# Patient Record
Sex: FEMALE | Race: WHITE
[De-identification: names, ages, dates, MRNs, and addresses within clinical notes are randomized per-mention and may not be internally consistent; named-entity substitution may affect disease eponyms.]

---

## 2023-06-07 ENCOUNTER — APPOINTMENT (OUTPATIENT)
Dept: GASTROENTEROLOGY | Facility: CLINIC | Age: 73
End: 2023-06-07
Payer: MEDICARE

## 2023-06-07 DIAGNOSIS — R12 HEARTBURN: ICD-10-CM

## 2023-06-07 DIAGNOSIS — K31.89 OTHER DISEASES OF STOMACH AND DUODENUM: ICD-10-CM

## 2023-06-07 DIAGNOSIS — B96.81 GASTRITIS, UNSPECIFIED, W/OUT BLEEDING: ICD-10-CM

## 2023-06-07 DIAGNOSIS — K29.70 GASTRITIS, UNSPECIFIED, W/OUT BLEEDING: ICD-10-CM

## 2023-06-07 DIAGNOSIS — Z78.9 OTHER SPECIFIED HEALTH STATUS: ICD-10-CM

## 2023-06-07 PROBLEM — Z00.00 ENCOUNTER FOR PREVENTIVE HEALTH EXAMINATION: Status: ACTIVE | Noted: 2023-06-07

## 2023-06-07 PROCEDURE — 99442: CPT

## 2023-06-07 RX ORDER — OMEPRAZOLE 20 MG/1
20 CAPSULE, DELAYED RELEASE ORAL
Refills: 0 | Status: ACTIVE | COMMUNITY
Start: 2023-06-07

## 2023-06-12 ENCOUNTER — RESULT REVIEW (OUTPATIENT)
Age: 73
End: 2023-06-12

## 2023-06-12 ENCOUNTER — OUTPATIENT (OUTPATIENT)
Dept: INPATIENT UNIT | Facility: HOSPITAL | Age: 73
LOS: 1 days | Discharge: HOME | End: 2023-06-12
Payer: MEDICARE

## 2023-06-12 ENCOUNTER — TRANSCRIPTION ENCOUNTER (OUTPATIENT)
Age: 73
End: 2023-06-12

## 2023-06-12 VITALS
TEMPERATURE: 97 F | RESPIRATION RATE: 18 BRPM | SYSTOLIC BLOOD PRESSURE: 157 MMHG | HEART RATE: 71 BPM | DIASTOLIC BLOOD PRESSURE: 78 MMHG | WEIGHT: 177.91 LBS | HEIGHT: 63 IN

## 2023-06-12 VITALS
HEART RATE: 63 BPM | DIASTOLIC BLOOD PRESSURE: 74 MMHG | SYSTOLIC BLOOD PRESSURE: 146 MMHG | OXYGEN SATURATION: 96 % | RESPIRATION RATE: 18 BRPM

## 2023-06-12 DIAGNOSIS — K31.89 OTHER DISEASES OF STOMACH AND DUODENUM: ICD-10-CM

## 2023-06-12 DIAGNOSIS — K29.70 GASTRITIS, UNSPECIFIED, WITHOUT BLEEDING: ICD-10-CM

## 2023-06-12 DIAGNOSIS — Z90.49 ACQUIRED ABSENCE OF OTHER SPECIFIED PARTS OF DIGESTIVE TRACT: Chronic | ICD-10-CM

## 2023-06-12 PROCEDURE — 88341 IMHCHEM/IMCYTCHM EA ADD ANTB: CPT | Mod: 26

## 2023-06-12 PROCEDURE — 88312 SPECIAL STAINS GROUP 1: CPT | Mod: 26

## 2023-06-12 PROCEDURE — 88305 TISSUE EXAM BY PATHOLOGIST: CPT

## 2023-06-12 PROCEDURE — 88313 SPECIAL STAINS GROUP 2: CPT

## 2023-06-12 PROCEDURE — 88305 TISSUE EXAM BY PATHOLOGIST: CPT | Mod: 26

## 2023-06-12 PROCEDURE — 88342 IMHCHEM/IMCYTCHM 1ST ANTB: CPT

## 2023-06-12 PROCEDURE — 88307 TISSUE EXAM BY PATHOLOGIST: CPT | Mod: 26

## 2023-06-12 PROCEDURE — 88312 SPECIAL STAINS GROUP 1: CPT

## 2023-06-12 PROCEDURE — 88342 IMHCHEM/IMCYTCHM 1ST ANTB: CPT | Mod: 26

## 2023-06-12 PROCEDURE — 43239 EGD BIOPSY SINGLE/MULTIPLE: CPT | Mod: XU

## 2023-06-12 PROCEDURE — 88341 IMHCHEM/IMCYTCHM EA ADD ANTB: CPT

## 2023-06-12 PROCEDURE — 88307 TISSUE EXAM BY PATHOLOGIST: CPT

## 2023-06-12 PROCEDURE — 88313 SPECIAL STAINS GROUP 2: CPT | Mod: 26

## 2023-06-12 PROCEDURE — 43242 EGD US FINE NEEDLE BX/ASPIR: CPT

## 2023-06-12 NOTE — ASU PATIENT PROFILE, ADULT - LANGUAGE ASSISTANCE NEEDED
thru  pt requests daughter to interpret/No-Patient/Caregiver offered and refused free interpretation services.

## 2023-06-12 NOTE — ASU PATIENT PROFILE, ADULT - FALL HARM RISK - UNIVERSAL INTERVENTIONS
Bed in lowest position, wheels locked, appropriate side rails in place/Call bell, personal items and telephone in reach/Instruct patient to call for assistance before getting out of bed or chair/Non-slip footwear when patient is out of bed/[place] to call system/Physically safe environment - no spills, clutter or unnecessary equipment/Purposeful Proactive Rounding/Room/bathroom lighting operational, light cord in reach

## 2023-06-12 NOTE — ASU DISCHARGE PLAN (ADULT/PEDIATRIC) - CALL YOUR DOCTOR IF YOU HAVE ANY OF THE FOLLOWING:
Bleeding that does not stop/Fever greater than (need to indicate Fahrenheit or Celsius)/Wound/Surgical Site with redness, or foul smelling discharge or pus/Numbness, tingling, color or temperature change to extremity/Excessive diarrhea/Inability to tolerate liquids or foods

## 2023-06-12 NOTE — CHART NOTE - NSCHARTNOTEFT_GEN_A_CORE
PACU ANESTHESIA ADMISSION NOTE      Procedure:   Post op diagnosis:      ____  Intubated  TV:______       Rate: ______      FiO2: ______    _x___  Patent Airway    _x___  Full return of protective reflexes    _x___  Full recovery from anesthesia / back to baseline status    Vitals  SPO2:-100  HR:-74  RR:-11  B.P:-99/52  TEMP:-98    Mental Status:  _x___ Awake   ___x_ Alert   _____ Drowsy   _____ Sedated    Nausea/Vomiting:  _x___  NO       ______Yes,   See Post - Op Orders         Pain Scale (0-10):  __0___    Treatment: _x___ None    __x__ See Post - Op/PCA Orders    Post - Operative Fluids:   ___ Oral   ____x See Post - Op Orders    Plan: Discharge:   __x__Home       ____Floor     _____Critical Care    _____  Other:_________________    Comments:  Report endorsed to RN in pacu  Vitals stable  No anesthesia issues or complications noted.  Discharge to patient to / home when criteria met.

## 2023-06-12 NOTE — ASU DISCHARGE PLAN (ADULT/PEDIATRIC) - CARE PROVIDER_API CALL
Jayme Mojica  Gastroenterology  [address] [place] [address]  Phone: [phone]  Fax: [phone]  Follow Up Time: Routine

## 2023-06-14 LAB — SURGICAL PATHOLOGY STUDY: SIGNIFICANT CHANGE UP

## 2023-06-16 DIAGNOSIS — K29.50 UNSPECIFIED CHRONIC GASTRITIS WITHOUT BLEEDING: ICD-10-CM

## 2023-06-16 DIAGNOSIS — K31.9 DISEASE OF STOMACH AND DUODENUM, UNSPECIFIED: ICD-10-CM

## 2023-06-16 DIAGNOSIS — K20.0 EOSINOPHILIC ESOPHAGITIS: ICD-10-CM

## 2023-06-16 DIAGNOSIS — K76.9 LIVER DISEASE, UNSPECIFIED: ICD-10-CM

## 2023-06-16 DIAGNOSIS — Z90.49 ACQUIRED ABSENCE OF OTHER SPECIFIED PARTS OF DIGESTIVE TRACT: ICD-10-CM

## 2023-06-16 LAB — SURGICAL PATHOLOGY STUDY: SIGNIFICANT CHANGE UP

## 2023-06-28 NOTE — ASSESSMENT
[FreeTextEntry1] : 73 yr old female with H/O H Pylori, heartburn was referred to us by Dr. Newman for further evaluation of a gastric mass noted on the lesser curvature of the gastric body seen on her last EGD done 4/26/23. Biopsies were positive for H Pylori; according to her son, she was treated for H Pylori in the past but not since her last EGD. She reported occasional heartburn that usually resolves with Omeprazole 20 mg a day. She denied dysphagia, vomiting, weight loss, melena, abdominal pain, constipation, diarrhea, or blood in the stool. \par \par \par Gastric mass, H Pylori gastritis\par \par - EGD results reviewed from 4/26/23; she will be scheduled for an EGD/EUS sometime this month; all risks and ;benefits of procedure were explained to patient's son; he requested that his sister, Irma be called regarding scheduling the procedure at [phone] \par - Will probably need retreatment for the H Pylori\par - F/U after the procedure is done

## 2023-06-28 NOTE — REVIEW OF SYSTEMS
[Heartburn] : heartburn [Negative] : Respiratory [Abdominal Pain] : no abdominal pain [Vomiting] : no vomiting [Constipation] : no constipation [Diarrhea] : no diarrhea [Melena (black stool)] : no melena [Bleeding] : no bleeding [Fecal Incontinence (soiling)] : no fecal incontinence [Bloating (gassiness)] : no bloating

## 2023-07-05 ENCOUNTER — APPOINTMENT (OUTPATIENT)
Dept: GASTROENTEROLOGY | Facility: CLINIC | Age: 73
End: 2023-07-05
Payer: MEDICARE

## 2023-07-05 DIAGNOSIS — Z78.9 OTHER SPECIFIED HEALTH STATUS: ICD-10-CM

## 2023-07-05 DIAGNOSIS — K76.9 LIVER DISEASE, UNSPECIFIED: ICD-10-CM

## 2023-07-05 DIAGNOSIS — K76.0 FATTY (CHANGE OF) LIVER, NOT ELSEWHERE CLASSIFIED: ICD-10-CM

## 2023-07-05 PROCEDURE — 99443: CPT

## 2023-07-06 NOTE — ASSESSMENT
[FreeTextEntry1] : 73 yr old female with H/O H Pylori, heartburn was referred to us by Dr. Newman for further evaluation of a gastric mass noted on the lesser curvature of the gastric body seen on her last EGD done 4/26/23. Biopsies were positive for H Pylori; according to her son, she was treated for H Pylori in the past but not since her last EGD. She reported occasional heartburn that usually resolves with Omeprazole 20 mg a day. She denied dysphagia, vomiting, weight loss, melena, abdominal pain, constipation, diarrhea, or blood in the stool. S/P EGD and EUS showed a hypoechoic lesion arising from the liver; biopsies showed scant fibrovascular tissue without neoplasia and confirmed eradication of H Pylori. Discussed patient with her daughter, Irma today. \par \par \par H/O Gastric mass, H Pylori gastritis, S/P EGD/EUS\par \par - EGD/EUS  results reviewed with Patients daughter, Irma today; benign findings noted, no evidence of H Pylori, IM, or dysplasia and there was evidence of fatty liver. \par - Need to get the actual disc from the CT Scan that was done in [facility] as well as the official report\par - I spoke to Dr. Newman as well regarding the above and the need for the disc to view the films of the CT Scan\par - Will F/U when above films are available to review\par

## 2023-07-06 NOTE — HISTORY OF PRESENT ILLNESS
[Home] : at home, [unfilled] , at the time of the visit. [Medical Office: ([facility])___] : at the medical office located in  [FreeTextEntry3] : Rodrick - Daughter  [FreeTextEntry4] : Miranda [FreeTextEntry1] : 73 yr old female with H/O H Pylori, heartburn was referred to us by Dr. Newman for further evaluation of a gastric mass noted on the lesser curvature of the gastric body seen on her last EGD done 4/26/23. Biopsies were positive for H Pylori; according to her son, she was treated for H Pylori in the past but not since her last EGD. She reported occasional heartburn that usually resolves with Omeprazole 20 mg a day. She denied dysphagia, vomiting, weight loss, melena, abdominal pain, constipation, diarrhea, or blood in the stool. S/P EGD and EUS showed a hypoechoic lesion arising from the liver; biopsies showed scant fibrovascular tissue without neoplasia and confirmed eradication of H Pylori.  [de-identified] : 6/12/23

## 2024-05-12 ENCOUNTER — INPATIENT (INPATIENT)
Facility: HOSPITAL | Age: 74
LOS: 3 days | Discharge: HOME | DRG: 71 | End: 2024-05-16
Attending: INTERNAL MEDICINE | Admitting: STUDENT IN AN ORGANIZED HEALTH CARE EDUCATION/TRAINING PROGRAM
Payer: MEDICARE

## 2024-05-12 VITALS
HEART RATE: 87 BPM | RESPIRATION RATE: 18 BRPM | TEMPERATURE: 98 F | WEIGHT: 179.9 LBS | SYSTOLIC BLOOD PRESSURE: 196 MMHG | DIASTOLIC BLOOD PRESSURE: 82 MMHG

## 2024-05-12 DIAGNOSIS — Z90.49 ACQUIRED ABSENCE OF OTHER SPECIFIED PARTS OF DIGESTIVE TRACT: Chronic | ICD-10-CM

## 2024-05-12 DIAGNOSIS — R41.82 ALTERED MENTAL STATUS, UNSPECIFIED: ICD-10-CM

## 2024-05-12 PROBLEM — Z78.9 OTHER SPECIFIED HEALTH STATUS: Chronic | Status: ACTIVE | Noted: 2023-06-12

## 2024-05-12 LAB
ALBUMIN SERPL ELPH-MCNC: 4.7 G/DL — SIGNIFICANT CHANGE UP (ref 3.5–5.2)
ALP SERPL-CCNC: 86 U/L — SIGNIFICANT CHANGE UP (ref 30–115)
ALT FLD-CCNC: 13 U/L — SIGNIFICANT CHANGE UP (ref 0–41)
ANION GAP SERPL CALC-SCNC: 12 MMOL/L — SIGNIFICANT CHANGE UP (ref 7–14)
APPEARANCE UR: ABNORMAL
APTT BLD: 27.2 SEC — SIGNIFICANT CHANGE UP (ref 27–39.2)
AST SERPL-CCNC: 21 U/L — SIGNIFICANT CHANGE UP (ref 0–41)
BACTERIA # UR AUTO: ABNORMAL /HPF
BASOPHILS # BLD AUTO: 0.05 K/UL — SIGNIFICANT CHANGE UP (ref 0–0.2)
BASOPHILS NFR BLD AUTO: 0.6 % — SIGNIFICANT CHANGE UP (ref 0–1)
BILIRUB SERPL-MCNC: 0.3 MG/DL — SIGNIFICANT CHANGE UP (ref 0.2–1.2)
BILIRUB UR-MCNC: NEGATIVE — SIGNIFICANT CHANGE UP
BUN SERPL-MCNC: 14 MG/DL — SIGNIFICANT CHANGE UP (ref 10–20)
CALCIUM SERPL-MCNC: 10.3 MG/DL — SIGNIFICANT CHANGE UP (ref 8.4–10.5)
CAST: 0 /LPF — SIGNIFICANT CHANGE UP (ref 0–4)
CHLORIDE SERPL-SCNC: 101 MMOL/L — SIGNIFICANT CHANGE UP (ref 98–110)
CO2 SERPL-SCNC: 25 MMOL/L — SIGNIFICANT CHANGE UP (ref 17–32)
COLOR SPEC: YELLOW — SIGNIFICANT CHANGE UP
CREAT SERPL-MCNC: 0.8 MG/DL — SIGNIFICANT CHANGE UP (ref 0.7–1.5)
DIFF PNL FLD: ABNORMAL
EGFR: 78 ML/MIN/1.73M2 — SIGNIFICANT CHANGE UP
EOSINOPHIL # BLD AUTO: 0.07 K/UL — SIGNIFICANT CHANGE UP (ref 0–0.7)
EOSINOPHIL NFR BLD AUTO: 0.8 % — SIGNIFICANT CHANGE UP (ref 0–8)
GLUCOSE SERPL-MCNC: 114 MG/DL — HIGH (ref 70–99)
GLUCOSE UR QL: NEGATIVE MG/DL — SIGNIFICANT CHANGE UP
HCT VFR BLD CALC: 44.7 % — SIGNIFICANT CHANGE UP (ref 37–47)
HGB BLD-MCNC: 15.3 G/DL — SIGNIFICANT CHANGE UP (ref 12–16)
IMM GRANULOCYTES NFR BLD AUTO: 0.2 % — SIGNIFICANT CHANGE UP (ref 0.1–0.3)
INR BLD: 1.02 RATIO — SIGNIFICANT CHANGE UP (ref 0.65–1.3)
KETONES UR-MCNC: NEGATIVE MG/DL — SIGNIFICANT CHANGE UP
LEUKOCYTE ESTERASE UR-ACNC: ABNORMAL
LYMPHOCYTES # BLD AUTO: 2.61 K/UL — SIGNIFICANT CHANGE UP (ref 1.2–3.4)
LYMPHOCYTES # BLD AUTO: 29.7 % — SIGNIFICANT CHANGE UP (ref 20.5–51.1)
MCHC RBC-ENTMCNC: 29.2 PG — SIGNIFICANT CHANGE UP (ref 27–31)
MCHC RBC-ENTMCNC: 34.2 G/DL — SIGNIFICANT CHANGE UP (ref 32–37)
MCV RBC AUTO: 85.3 FL — SIGNIFICANT CHANGE UP (ref 81–99)
MONOCYTES # BLD AUTO: 0.43 K/UL — SIGNIFICANT CHANGE UP (ref 0.1–0.6)
MONOCYTES NFR BLD AUTO: 4.9 % — SIGNIFICANT CHANGE UP (ref 1.7–9.3)
NEUTROPHILS # BLD AUTO: 5.62 K/UL — SIGNIFICANT CHANGE UP (ref 1.4–6.5)
NEUTROPHILS NFR BLD AUTO: 63.8 % — SIGNIFICANT CHANGE UP (ref 42.2–75.2)
NITRITE UR-MCNC: NEGATIVE — SIGNIFICANT CHANGE UP
NRBC # BLD: 0 /100 WBCS — SIGNIFICANT CHANGE UP (ref 0–0)
PH UR: 7.5 — SIGNIFICANT CHANGE UP (ref 5–8)
PLATELET # BLD AUTO: 236 K/UL — SIGNIFICANT CHANGE UP (ref 130–400)
PMV BLD: 10.4 FL — SIGNIFICANT CHANGE UP (ref 7.4–10.4)
POTASSIUM SERPL-MCNC: 4.4 MMOL/L — SIGNIFICANT CHANGE UP (ref 3.5–5)
POTASSIUM SERPL-SCNC: 4.4 MMOL/L — SIGNIFICANT CHANGE UP (ref 3.5–5)
PROT SERPL-MCNC: 7.3 G/DL — SIGNIFICANT CHANGE UP (ref 6–8)
PROT UR-MCNC: NEGATIVE MG/DL — SIGNIFICANT CHANGE UP
PROTHROM AB SERPL-ACNC: 11.6 SEC — SIGNIFICANT CHANGE UP (ref 9.95–12.87)
RBC # BLD: 5.24 M/UL — SIGNIFICANT CHANGE UP (ref 4.2–5.4)
RBC # FLD: 12.5 % — SIGNIFICANT CHANGE UP (ref 11.5–14.5)
RBC CASTS # UR COMP ASSIST: 3 /HPF — SIGNIFICANT CHANGE UP (ref 0–4)
SODIUM SERPL-SCNC: 138 MMOL/L — SIGNIFICANT CHANGE UP (ref 135–146)
SP GR SPEC: 1.02 — SIGNIFICANT CHANGE UP (ref 1–1.03)
SQUAMOUS # UR AUTO: >36 /HPF — HIGH (ref 0–5)
TROPONIN T, HIGH SENSITIVITY RESULT: 7 NG/L — SIGNIFICANT CHANGE UP (ref 6–13)
UROBILINOGEN FLD QL: 0.2 MG/DL — SIGNIFICANT CHANGE UP (ref 0.2–1)
WBC # BLD: 8.8 K/UL — SIGNIFICANT CHANGE UP (ref 4.8–10.8)
WBC # FLD AUTO: 8.8 K/UL — SIGNIFICANT CHANGE UP (ref 4.8–10.8)
WBC UR QL: 10 /HPF — HIGH (ref 0–5)

## 2024-05-12 PROCEDURE — 93971 EXTREMITY STUDY: CPT | Mod: RT

## 2024-05-12 PROCEDURE — 93010 ELECTROCARDIOGRAM REPORT: CPT

## 2024-05-12 PROCEDURE — 85027 COMPLETE CBC AUTOMATED: CPT

## 2024-05-12 PROCEDURE — 82746 ASSAY OF FOLIC ACID SERUM: CPT

## 2024-05-12 PROCEDURE — 82607 VITAMIN B-12: CPT

## 2024-05-12 PROCEDURE — 70450 CT HEAD/BRAIN W/O DYE: CPT | Mod: 26,MC,XU

## 2024-05-12 PROCEDURE — 70498 CT ANGIOGRAPHY NECK: CPT | Mod: 26,MC

## 2024-05-12 PROCEDURE — 86780 TREPONEMA PALLIDUM: CPT

## 2024-05-12 PROCEDURE — 84443 ASSAY THYROID STIM HORMONE: CPT

## 2024-05-12 PROCEDURE — 70551 MRI BRAIN STEM W/O DYE: CPT | Mod: MC

## 2024-05-12 PROCEDURE — 99223 1ST HOSP IP/OBS HIGH 75: CPT

## 2024-05-12 PROCEDURE — 36415 COLL VENOUS BLD VENIPUNCTURE: CPT

## 2024-05-12 PROCEDURE — 70496 CT ANGIOGRAPHY HEAD: CPT | Mod: 26,MC

## 2024-05-12 PROCEDURE — 95816 EEG AWAKE AND DROWSY: CPT

## 2024-05-12 PROCEDURE — 83735 ASSAY OF MAGNESIUM: CPT

## 2024-05-12 PROCEDURE — 80061 LIPID PANEL: CPT

## 2024-05-12 PROCEDURE — 97161 PT EVAL LOW COMPLEX 20 MIN: CPT | Mod: GP

## 2024-05-12 PROCEDURE — 0042T: CPT | Mod: MC

## 2024-05-12 PROCEDURE — 80048 BASIC METABOLIC PNL TOTAL CA: CPT

## 2024-05-12 PROCEDURE — 99285 EMERGENCY DEPT VISIT HI MDM: CPT

## 2024-05-12 RX ORDER — ENOXAPARIN SODIUM 100 MG/ML
40 INJECTION SUBCUTANEOUS EVERY 24 HOURS
Refills: 0 | Status: DISCONTINUED | OUTPATIENT
Start: 2024-05-12 | End: 2024-05-16

## 2024-05-12 RX ORDER — CEFTRIAXONE 500 MG/1
1000 INJECTION, POWDER, FOR SOLUTION INTRAMUSCULAR; INTRAVENOUS ONCE
Refills: 0 | Status: COMPLETED | OUTPATIENT
Start: 2024-05-12 | End: 2024-05-12

## 2024-05-12 RX ORDER — OMEPRAZOLE 10 MG/1
1 CAPSULE, DELAYED RELEASE ORAL
Refills: 0 | DISCHARGE

## 2024-05-12 RX ORDER — CEFTRIAXONE 500 MG/1
1000 INJECTION, POWDER, FOR SOLUTION INTRAMUSCULAR; INTRAVENOUS EVERY 24 HOURS
Refills: 0 | Status: DISCONTINUED | OUTPATIENT
Start: 2024-05-12 | End: 2024-05-13

## 2024-05-12 RX ADMIN — CEFTRIAXONE 100 MILLIGRAM(S): 500 INJECTION, POWDER, FOR SOLUTION INTRAMUSCULAR; INTRAVENOUS at 16:47

## 2024-05-12 RX ADMIN — CEFTRIAXONE 100 MILLIGRAM(S): 500 INJECTION, POWDER, FOR SOLUTION INTRAMUSCULAR; INTRAVENOUS at 21:43

## 2024-05-12 RX ADMIN — ENOXAPARIN SODIUM 40 MILLIGRAM(S): 100 INJECTION SUBCUTANEOUS at 21:45

## 2024-05-12 NOTE — ED PROVIDER NOTE - PHYSICAL EXAMINATION
VITAL SIGNS: I have reviewed nursing notes and confirm.  CONSTITUTIONAL: Well-appearing, non-toxic, in NAD  SKIN: Warm dry, normal skin turgor  HEAD: NCAT  EYES: No conjunctival injection, scleral anicteric  ENT: MMM  NECK: Supple; FROM.   CARD: RRR  RESP: CTAB  ABD: Soft, NDNT  EXT: No pedal edema, no calf tenderness  NEURO: A&Ox2, CN2-12 intact and equal bilateral. PERRLA, EOMI. Clear and fluent speech. No pronator drift, no ataxia/normal FNF. Strength 5/5 throughout, normal sensation.   PSYCH: Cooperative, appropriate

## 2024-05-12 NOTE — CONSULT NOTE ADULT - SUBJECTIVE AND OBJECTIVE BOX
**STROKE CODE CONSULT NOTE**    Last known well time/Time of onset of symptoms: 24 190    HPI: 74y Female with PMHx of     T(C): 36.6 (24 @ 13:53), Max: 36.6 (24 @ 13:53)  HR: 87 (24 @ 13:53) (87 - 87)  BP: 196/ (24 @ 13:53) ( - )  RR: 18 (24 @ 13:53) (18 - 18)  SpO2: --    PAST MEDICAL & SURGICAL HISTORY:  No pertinent past medical history      History of appendectomy          FAMILY HISTORY:  No pertinent family history in first degree relatives        SOCIAL HISTORY:  Denies smoking, drinking, or drug use    ROS: ***  Constitutional: No fever, weight loss or fatigue  Eyes: No eye pain, visual disturbances, or discharge  ENMT:  No difficulty hearing, tinnitus, vertigo; No sinus or throat pain  Neck: No pain or stiffness  Respiratory: No cough, wheezing, chills or hemoptysis  Cardiovascular: No chest pain, palpitations, shortness of breath, dizziness or leg swelling  Gastrointestinal: No abdominal pain. No nausea, vomiting or hematemesis; No diarrhea or constipation. Nohematochezia.  Genitourinary: No dysuria, frequency, hematuria or incontinence  Neurological: As per HPI  Skin: No itching, burning, rashes or lesions   Endocrine: No heat or cold intolerance; No hair loss  Musculoskeletal: No joint pain or swelling; No muscle, back or extremity pain  Psychiatric: No depression, anxiety, mood swings or difficulty sleeping  Heme/Lymph: No easy bruising or bleeding gums    MEDICATIONS  (STANDING):    MEDICATIONS  (PRN):    Home Medications:  omeprazole 20 mg oral delayed release tablet: 1 orally (2023 14:19)      Allergies    No Known Allergies    Intolerances      Vital Signs Last 24 Hrs  T(C): 36.6 (12 May 2024 13:53), Max: 36.6 (12 May 2024 13:53)  T(F): 97.8 (12 May 2024 13:53), Max: 97.8 (12 May 2024 13:53)  HR: 87 (12 May 2024 13:53) (87 - 87)  BP: 196/82 (12 May 2024 13:53) (196/82 - 196/82)  BP(mean): --  RR: 18 (12 May 2024 13:53) (18 - 18)  SpO2: --    Parameters below as of 12 May 2024 13:53  Patient On (Oxygen Delivery Method): room air        Physical exam:  General: No acute distress, awake and alert  Cardiovascular: Regular rate and rhythm, no murmurs, rubs, or gallops. No bruits  Pulmonary: Anterior breath sounds clear bilaterally, no crackles or wheezing. No use of accessory muscles  Extremities: Radial and DP pulses +2, no edema    Neurologic:  -Mental status: Awake, alert, oriented to person, place, and time. Speech is fluent with intact naming, repetition, and comprehension, no dysarthria. Recent and remote memory intact. Follows commands. Attention/concentration intact. Fund of knowledge appropriate.  -Cranial nerves:   II: Visual fields are full to confrontation.  III, IV, VI: Extraocular movements are intact without nystagmus. Pupils equally round and reactive to light  V:  Facial sensation V1-V3 equal and intact   VII: Face is symmetric with normal eye closure and smile  VIII: Hearing is bilaterally intact to finger rub  IX, X: Uvula is midline and soft palate rises symmetrically  XI: Head turning and shoulder shrug are intact.  XII: Tongue protrudes midline  Motor: Normal bulk and tone. No pronator drift. Strength bilateral upper extremity 5/5, bilateral lower extremities 5/5.  Rapid alternating movements intact and symmetric  Sensation: Intact to light touch bilaterally. No neglect or extinction on double simultaneous testing.  Coordination: No dysmetria on finger-to-nose and heel-to-shin bilaterally  Reflexes: Downgoing toes bilaterally   Gait: Narrow gait and steady    NIHSS: ****  ICH: *****  GCS: *****  ASPECTS Score: ****    Fingerstick Blood Glucose: CAPILLARY BLOOD GLUCOSE  106 (12 May 2024 16:14)      POCT Blood Glucose.: 106 mg/dL (12 May 2024 13:45)    LABS:                        15.3   8.80  )-----------( 236      ( 12 May 2024 14:15 )             44.7     05-12    138  |  101  |  14  ----------------------------<  114<H>  4.4   |  25  |  0.8    Ca    10.3      12 May 2024 14:15    TPro  7.3  /  Alb  4.7  /  TBili  0.3  /  DBili  x   /  AST  21  /  ALT  13  /  AlkPhos  86  05-12    PT/INR - ( 12 May 2024 14:15 )   PT: 11.60 sec;   INR: 1.02 ratio         PTT - ( 12 May 2024 14:15 )  PTT:27.2 sec      Urinalysis Basic - ( 12 May 2024 15:13 )    Color: Yellow / Appearance: Turbid / S.020 / pH: x  Gluc: x / Ketone: Negative mg/dL  / Bili: Negative / Urobili: 0.2 mg/dL   Blood: x / Protein: Negative mg/dL / Nitrite: Negative   Leuk Esterase: Moderate / RBC: 3 /HPF / WBC 10 /HPF   Sq Epi: x / Non Sq Epi: >36 /HPF / Bacteria: Moderate /HPF        RADIOLOGY & ADDITIONAL STUDIES:    HCT:    CTA:    CTP:      -----------------------------------------------------------------------------------------------------------------  IV-tenecetplase(Y/N):    ***                              Bolus time:  Reason IV-tenecetplase not given: ***  Alteplase dosing verified with RN _______      ASSESSMENT/PLAN:    74y Female w/ PMH ***. HCT showed ***. CTA showed ***. Given *** tenecetplase was ***.         **STROKE CODE CONSULT NOTE**    Last known well time/Time of onset of symptoms: 24 190    HPI: 74y Female with PMHx of anxiety presents for AMS and numbness on left side of face and left arm. As per family, pt was in her usual state of health until 7pm yesturday when she became acutely altered. Per daughter, pt was hallucinating asking about dead family members. Pt went to sleep and woke up endorsing left sided facial and arm numbness described as pins and needles sensation. Pt also was endorsing chest tightness. Family endorses pt continues to be confused asking what happened yesterday. Of note, as per daughter, pts anxiety mainifests as numbness on one side of the body, and pt had family friend pass away 3d ago. Pt denies vision change, seizures, facial asymmetry, HA, falls, leaning/favoring one side of body.     T(C): 36.6 (24 @ 13:53), Max: 36.6 (24 @ 13:53)  HR: 87 (24 @ 13:53) (87 - 87)  BP: 196/82 (24 @ 13:53) (19682 - )  RR: 18 (24 @ 13:53) (18 - 18)  SpO2: --    PAST MEDICAL & SURGICAL HISTORY:  No pertinent past medical history      History of appendectomy          FAMILY HISTORY:  No pertinent family history in first degree relatives        SOCIAL HISTORY:  Denies smoking, drinking, or drug use    ROS: see hpi     MEDICATIONS  (STANDING):    MEDICATIONS  (PRN):    Home Medications:  omeprazole 20 mg oral delayed release tablet: 1 orally (2023 14:19)      Allergies    No Known Allergies    Intolerances      Vital Signs Last 24 Hrs  T(C): 36.6 (12 May 2024 13:53), Max: 36.6 (12 May 2024 13:53)  T(F): 97.8 (12 May 2024 13:53), Max: 97.8 (12 May 2024 13:53)  HR: 87 (12 May 2024 13:53) (87 - 87)  BP: 196/82 (12 May 2024 13:53) (196/82 - 196/)  BP(mean): --  RR: 18 (12 May 2024 13:53) (18 - 18)  SpO2: --    Parameters below as of 12 May 2024 13:53  Patient On (Oxygen Delivery Method): room air        Physical exam:  General: No acute distress, awake and alert    Neurologic:  -Mental status: Awake, alert, A&O x2 (not oriented to time). Speech is fluent with intact naming, repetition, and comprehension, no dysarthria. Recent and remote memory intact. Follows commands. Attention/concentration intact. Fund of knowledge appropriate.  -Cranial nerves:   II: Visual fields are full to confrontation.  III, IV, VI: Extraocular movements are intact without nystagmus. Pupils equally round and reactive to light  V:  Facial sensation V1-V3 equal and intact   VII: Face is symmetric with normal eye closure and smile  VIII: Hearing is bilaterally intact to finger rub  IX, X: Uvula is midline and soft palate rises symmetrically  XI: Head turning and shoulder shrug are intact.  XII: Tongue protrudes midline  Motor: Normal bulk and tone. No pronator drift. Strength bilateral upper extremity 5/5, bilateral lower extremities 5/5.ic  Sensation: Intact to light touch bilaterally. No neglect or extinction on double simultaneous testing.  Coordination: No dysmetria on finger-to-nose and heel-to-shin bilaterally      NIHSS:1    Fingerstick Blood Glucose: CAPILLARY BLOOD GLUCOSE  106 (12 May 2024 16:14)      POCT Blood Glucose.: 106 mg/dL (12 May 2024 13:45)    LABS:                        15.3   8.80  )-----------( 236      ( 12 May 2024 14:15 )             44.7     05-12    138  |  101  |  14  ----------------------------<  114<H>  4.4   |  25  |  0.8    Ca    10.3      12 May 2024 14:15    TPro  7.3  /  Alb  4.7  /  TBili  0.3  /  DBili  x   /  AST  21  /  ALT  13  /  AlkPhos  86  05-12    PT/INR - ( 12 May 2024 14:15 )   PT: 11.60 sec;   INR: 1.02 ratio         PTT - ( 12 May 2024 14:15 )  PTT:27.2 sec      Urinalysis Basic - ( 12 May 2024 15:13 )    Color: Yellow / Appearance: Turbid / S.020 / pH: x  Gluc: x / Ketone: Negative mg/dL  / Bili: Negative / Urobili: 0.2 mg/dL   Blood: x / Protein: Negative mg/dL / Nitrite: Negative   Leuk Esterase: Moderate / RBC: 3 /HPF / WBC 10 /HPF   Sq Epi: x / Non Sq Epi: >36 /HPF / Bacteria: Moderate /HPF        RADIOLOGY & ADDITIONAL STUDIES:    HCT:  IMPRESSION:  No CT evidence of large acute territorial infarct or acute intracranial pathology.      PERFUSION:  No evidence of perfusion abnormality.    CTA HEAD:  No evidence of intracranial vessel occlusion or aneurysm.    CTA NECK:  No evidence of greater than 50% carotid or vertebral artery stenosis.  -----------------------------------------------------------------------------------------------------------------  IV-tenecetplase(Y/N):   no  Reason IV-tenecetplase not given: out of window     =  **STROKE CODE CONSULT NOTE**    Last known well time/Time of onset of symptoms: 24 1900    HPI: 74y Female with PMHx of anxiety presents for AMS and numbness on left side of face and left arm. As per family, pt was in her usual state of health until 7pm yesterday when she became acutely altered. Per daughter, pt was hallucinating asking about dead family members. Pt went to sleep and woke up endorsing left sided facial and arm numbness described as pins and needles sensation. Pt also was endorsing chest tightness. Family endorses pt continues to be confused asking what happened yesterday. Of note, as per daughter, pts anxiety manifests as numbness on one side of the body, and pt had family friend pass away 3d ago. Pt denies vision change, seizures, facial asymmetry, HA, falls, leaning/favoring one side of body.     T(C): 36.6 (24 @ 13:53), Max: 36.6 (24 @ 13:53)  HR: 87 (24 @ 13:53) (87 - 87)  BP: 196/82 (24 @ 13:53) (196/82 - 196/82)  RR: 18 (24 @ 13:53) (18 - 18)  SpO2: --    PAST MEDICAL & SURGICAL HISTORY:  No pertinent past medical history      History of appendectomy          FAMILY HISTORY:  No pertinent family history in first degree relatives        SOCIAL HISTORY:  Denies smoking, drinking, or drug use    ROS: see hpi     MEDICATIONS  (STANDING):    MEDICATIONS  (PRN):    Home Medications:  omeprazole 20 mg oral delayed release tablet: 1 orally (2023 14:19)      Allergies    No Known Allergies    Intolerances      Vital Signs Last 24 Hrs  T(C): 36.6 (12 May 2024 13:53), Max: 36.6 (12 May 2024 13:53)  T(F): 97.8 (12 May 2024 13:53), Max: 97.8 (12 May 2024 13:53)  HR: 87 (12 May 2024 13:53) (87 - 87)  BP: 196/82 (12 May 2024 13:53) (196/82 - 196/82)  BP(mean): --  RR: 18 (12 May 2024 13:53) (18 - 18)  SpO2: --    Parameters below as of 12 May 2024 13:53  Patient On (Oxygen Delivery Method): room air        Physical exam:  General: No acute distress, awake and alert    Neurologic:  -Mental status: Awake, alert, A&O x2 (not oriented to time). Speech is fluent with intact naming, repetition, and comprehension, no dysarthria. Recent and remote memory intact. Follows commands. Attention/concentration intact. Fund of knowledge appropriate.  -Cranial nerves:   II: Visual fields are full to confrontation.  III, IV, VI: Extraocular movements are intact without nystagmus. Pupils equally round and reactive to light  V:  Facial sensation V1-V3 equal and intact   VII: Face is symmetric with normal eye closure and smile  VIII: Hearing is bilaterally intact to finger rub  IX, X: Uvula is midline and soft palate rises symmetrically  XI: Head turning and shoulder shrug are intact.  XII: Tongue protrudes midline  Motor: Normal bulk and tone. No pronator drift. Strength bilateral upper extremity 5/5, bilateral lower extremities 5/5.ic  Sensation: Intact to light touch bilaterally. No neglect or extinction on double simultaneous testing.  Coordination: No dysmetria on finger-to-nose and heel-to-shin bilaterally      NIHSS:1    Fingerstick Blood Glucose: CAPILLARY BLOOD GLUCOSE  106 (12 May 2024 16:14)      POCT Blood Glucose.: 106 mg/dL (12 May 2024 13:45)    LABS:                        15.3   8.80  )-----------( 236      ( 12 May 2024 14:15 )             44.7     05-12    138  |  101  |  14  ----------------------------<  114<H>  4.4   |  25  |  0.8    Ca    10.3      12 May 2024 14:15    TPro  7.3  /  Alb  4.7  /  TBili  0.3  /  DBili  x   /  AST  21  /  ALT  13  /  AlkPhos  86  05-12    PT/INR - ( 12 May 2024 14:15 )   PT: 11.60 sec;   INR: 1.02 ratio         PTT - ( 12 May 2024 14:15 )  PTT:27.2 sec      Urinalysis Basic - ( 12 May 2024 15:13 )    Color: Yellow / Appearance: Turbid / S.020 / pH: x  Gluc: x / Ketone: Negative mg/dL  / Bili: Negative / Urobili: 0.2 mg/dL   Blood: x / Protein: Negative mg/dL / Nitrite: Negative   Leuk Esterase: Moderate / RBC: 3 /HPF / WBC 10 /HPF   Sq Epi: x / Non Sq Epi: >36 /HPF / Bacteria: Moderate /HPF        RADIOLOGY & ADDITIONAL STUDIES:    HCT:  IMPRESSION:  No CT evidence of large acute territorial infarct or acute intracranial pathology.      PERFUSION:  No evidence of perfusion abnormality.    CTA HEAD:  No evidence of intracranial vessel occlusion or aneurysm.    CTA NECK:  No evidence of greater than 50% carotid or vertebral artery stenosis.  -----------------------------------------------------------------------------------------------------------------  IV-tenecetplase(Y/N):   no  Reason IV-tenecetplase not given: out of window     =

## 2024-05-12 NOTE — ED ADULT NURSE NOTE - OBJECTIVE STATEMENT
Patient presents to ED c/o left side numbness, noted with full ROM of extremities, no signs of drift noted. Patient also reports AMS, unsure of where she is and family reports patient Is not at her baseline mental status

## 2024-05-12 NOTE — ED PROVIDER NOTE - NSTOBACCOUNKNOWNSMK_GEN_A_CORE_RD
Progress Notes by Amrik Lundberg at 08/29/17 11:11 AM     Author:  Amrik Lundberg Service:  (none) Author Type:  Certified Medical Assistant     Filed:  08/29/17 11:11 AM Encounter Date:  8/28/2017 Status:  Signed     :  Milly Louis (Certified Medical Assistant)            Patient response noted via [address] [place] [address].   Please see results from[CY1.1T] 8/28/17[CY1.1M]      Revision History        User Key Date/Time User Provider Type Action    > CY1.1 08/29/17 11:11 AM Milly Louis Certified Medical Assistant Sign    M - Manual, T - Template Cognitively Impaired

## 2024-05-12 NOTE — H&P ADULT - NSHPLABSRESULTS_GEN_ALL_CORE
LABS:  cret                        15.3   8.80  )-----------( 236      ( 12 May 2024 14:15 )             44.7     05-12    138  |  101  |  14  ----------------------------<  114<H>  4.4   |  25  |  0.8    Ca    10.3      12 May 2024 14:15    TPro  7.3  /  Alb  4.7  /  TBili  0.3  /  DBili  x   /  AST  21  /  ALT  13  /  AlkPhos  86  05-12    PT/INR - ( 12 May 2024 14:15 )   PT: 11.60 sec;   INR: 1.02 ratio         PTT - ( 12 May 2024 14:15 )  PTT:27.2 sec

## 2024-05-12 NOTE — ED PROVIDER NOTE - DIFFERENTIAL DIAGNOSIS
AMS - r/o stroke vs intracranial pathology vs sepsis vs other underlying metabolic derangement. Differential Diagnosis

## 2024-05-12 NOTE — PATIENT PROFILE ADULT - FUNCTIONAL ASSESSMENT - DAILY ACTIVITY 5.
Dermatology Rooming Note    Jon Yun's goals for this visit include:   Chief Complaint   Patient presents with    Derm Problem     Eric is here today for a lesion of concern on his left scalp.       Alex Pearce, CMA    
Lidocaine-epinephrine 1-1:446408 % injection   0.5 mL once for one use, starting 4/23/2024 ending 4/23/2024,  2mL disp, R-0, injection  Injected by Alex Pearce CMA     
3 = A little assistance

## 2024-05-12 NOTE — ED ADULT NURSE NOTE - NSFALLUNIVINTERV_ED_ALL_ED
done
Bed/Stretcher in lowest position, wheels locked, appropriate side rails in place/Call bell, personal items and telephone in reach/Instruct patient to call for assistance before getting out of bed/chair/stretcher/Non-slip footwear applied when patient is off stretcher/[place] to call system/Physically safe environment - no spills, clutter or unnecessary equipment/Purposeful proactive rounding/Room/bathroom lighting operational, light cord in reach

## 2024-05-12 NOTE — CONSULT NOTE ADULT - NS ATTEND AMEND GEN_ALL_CORE FT
My attestation supersedes that of the above documentation.    Pt is a 75 yo F with PMHx of anxiety who presented with spell of memory loss while crying at a close family friend's . She does not recall getting to their house or how she got to the hospital and she was repeating questions during the event. Now back to baseline, NIHSS 0, 1/3 delayed recall.     Impr: Transient global amnesia  MRI brain pending  Consider rEEG as well  CT head without acute process and CTA head/neck without significant flow limiting stenosis  ASA/statin for now, however if no stroke on MRI, may d/c

## 2024-05-12 NOTE — H&P ADULT - ATTENDING COMMENTS
Patient seen and examined at bedside independently of the residents. I read the resident's note and agree with the plan with the additions and corrections as noted below. My note supersedes the resident's note.     REVIEW OF SYSTEMS:  Negative except in HPI.     PMH: Anxiety and Mild dementia     FHx: Reviewed. No fhx of asthma/copd, No fhx of liver and pulmonary disease. No fhx of hematological disorder.     Physical Exam:  GEN: No acute distress. Awake, Alert and oriented x 2.   Head: Atraumatic, Normocephalic.   Eye: PEERLA. No sclera icterus. EOMI.   ENT: Normal oropharynx, no thyromegaly, no mass, no lymphadenopathy.   LUNGS: Clear to auscultation bilaterally. No wheeze/rales/crackles.   HEART: Normal. S1/S2 present. RRR. No murmur/gallops.   ABD: Soft, non-tender, non-distended. Bowel sounds present.   EXT: No pitting edema. No erythema. No tenderness.  Integumentary: No rash, No sore, No petechia.   NEURO: CN III-XII intact. Strength: 5/5 b/l ULE. Sensory intact b/l ULE.     Vital Signs Last 24 Hrs  T(C): 36.6 (12 May 2024 18:02), Max: 36.6 (12 May 2024 13:53)  T(F): 97.9 (12 May 2024 18:02), Max: 97.9 (12 May 2024 18:02)  HR: 64 (12 May 2024 18:02) (64 - 87)  BP: 149/69 (12 May 2024 18:02) (149/69 - 196/82)  BP(mean): 96 (12 May 2024 18:02) (96 - 96)  RR: 18 (12 May 2024 18:02) (18 - 18)  SpO2: 95% (12 May 2024 18:02) (95% - 99%)    Parameters below as of 12 May 2024 18:02  Patient On (Oxygen Delivery Method): room air      Please see the above notes for Labs and radiology.     Assessment and Plan:     75 yo F with hx of Anxiety and Mild dementia presents to ED for AMS and numbness.     Transient AMS   - Ddx: UTI vs. Anxiety/Panic disorder   - s/p stroke code activated in ED. s/p eval by neuro team in ED.   - CTH: neg for acute intracranial pathology.   - CTP: No evidence of perfusion abnormality.  - CTA H & N: No evidence of intracranial vessel occlusion or aneurysm. No evidence of greater than 50% carotid or vertebral artery stenosis.  - UA positive. Will treat with ceftriaxone. F/u UCx.   - check reversible dementia panel (TSH, B12, folate)  - supportive care.   - F/u Neurology.     Elevated BP likely 2/2 anxiety  - monitor BP closely. may start on low dose med if SBP persistently > 150.    Anxiety/Mild dementia - check reversible dementia panel. F/u neuro outpatient.     DVT ppx: Lovenox SC  GI ppx: not indicated.    Diet: Regular diet  Activity: as tolerated.     Date seen by the attending:   Total time spent: 75 minutes. 05/12/2024 Patient seen and examined at bedside independently of the residents. I read the resident's note and agree with the plan with the additions and corrections as noted below. My note supersedes the resident's note.     REVIEW OF SYSTEMS:  Negative except in HPI.     PMH: Anxiety and Mild dementia     FHx: Reviewed. No fhx of asthma/copd, No fhx of liver and pulmonary disease. No fhx of hematological disorder.     Physical Exam:  GEN: No acute distress. Awake, Alert and oriented x 2-3.   Head: Atraumatic, Normocephalic.   Eye: PEERLA. No sclera icterus. EOMI.   ENT: Normal oropharynx, no thyromegaly, no mass, no lymphadenopathy.   LUNGS: Clear to auscultation bilaterally. No wheeze/rales/crackles.   HEART: Normal. S1/S2 present. RRR. No murmur/gallops.   ABD: Soft, non-tender, non-distended. Bowel sounds present.   EXT: No pitting edema. No erythema. No tenderness.  Integumentary: No rash, No sore, No petechia.   NEURO: CN III-XII intact. Strength: 5/5 b/l ULE. Sensory intact b/l ULE.     Vital Signs Last 24 Hrs  T(C): 36.6 (12 May 2024 18:02), Max: 36.6 (12 May 2024 13:53)  T(F): 97.9 (12 May 2024 18:02), Max: 97.9 (12 May 2024 18:02)  HR: 64 (12 May 2024 18:02) (64 - 87)  BP: 149/69 (12 May 2024 18:02) (149/69 - 196/82)  BP(mean): 96 (12 May 2024 18:02) (96 - 96)  RR: 18 (12 May 2024 18:02) (18 - 18)  SpO2: 95% (12 May 2024 18:02) (95% - 99%)    Parameters below as of 12 May 2024 18:02  Patient On (Oxygen Delivery Method): room air      Please see the above notes for Labs and radiology.     Assessment and Plan:     73 yo F with hx of Anxiety and Mild dementia presents to ED for AMS and numbness.     Transient AMS   - Ddx: UTI vs. Anxiety/Panic disorder   - s/p stroke code activated in ED. s/p eval by neuro team in ED.   - CTH: neg for acute intracranial pathology.   - CTP: No evidence of perfusion abnormality.  - CTA H & N: No evidence of intracranial vessel occlusion or aneurysm. No evidence of greater than 50% carotid or vertebral artery stenosis.  - UA positive. Will treat with ceftriaxone. F/u UCx.   - check reversible dementia panel (TSH, B12, folate)  - supportive care.   - F/u Neurology.     Elevated BP likely 2/2 anxiety  - monitor BP closely. may start on low dose med if SBP persistently > 150.    Anxiety/Mild dementia - check reversible dementia panel. F/u neuro outpatient.     DVT ppx: Lovenox SC  GI ppx: not indicated.    Diet: Regular diet  Activity: as tolerated.     Date seen by the attending:   Total time spent: 75 minutes. 05/12/2024 Patient seen and examined at bedside independently of the residents. I read the resident's note and agree with the plan with the additions and corrections as noted below. My note supersedes the resident's note.     REVIEW OF SYSTEMS:  Negative except in HPI.     PMH: Anxiety and Mild dementia     FHx: Reviewed. No fhx of asthma/copd, No fhx of liver and pulmonary disease. No fhx of hematological disorder.     Physical Exam:  GEN: No acute distress. Awake, Alert and oriented x 2-3.   Head: Atraumatic, Normocephalic.   Eye: PEERLA. No sclera icterus. EOMI.   ENT: Normal oropharynx, no thyromegaly, no mass, no lymphadenopathy.   LUNGS: Clear to auscultation bilaterally. No wheeze/rales/crackles.   HEART: Normal. S1/S2 present. RRR. No murmur/gallops.   ABD: Soft, non-tender, non-distended. Bowel sounds present.   EXT: No pitting edema. No erythema. No tenderness.  Integumentary: No rash, No sore, No petechia.   NEURO: CN III-XII intact. Strength: 5/5 b/l ULE. Sensory intact b/l ULE.     Vital Signs Last 24 Hrs  T(C): 36.6 (12 May 2024 18:02), Max: 36.6 (12 May 2024 13:53)  T(F): 97.9 (12 May 2024 18:02), Max: 97.9 (12 May 2024 18:02)  HR: 64 (12 May 2024 18:02) (64 - 87)  BP: 149/69 (12 May 2024 18:02) (149/69 - 196/82)  BP(mean): 96 (12 May 2024 18:02) (96 - 96)  RR: 18 (12 May 2024 18:02) (18 - 18)  SpO2: 95% (12 May 2024 18:02) (95% - 99%)    Parameters below as of 12 May 2024 18:02  Patient On (Oxygen Delivery Method): room air      Please see the above notes for Labs and radiology.     Assessment and Plan:     75 yo F with hx of Anxiety and Mild dementia presents to ED for AMS and numbness.     Transient AMS   - Ddx: UTI vs. Anxiety/Panic disorder   - s/p stroke code activated in ED. s/p eval by neuro team in ED.   - CTH: neg for acute intracranial pathology.   - CTP: No evidence of perfusion abnormality.  - CTA H & N: No evidence of intracranial vessel occlusion or aneurysm. No evidence of greater than 50% carotid or vertebral artery stenosis.  - UA positive. Will treat with ceftriaxone. F/u UCx.   - check reversible dementia panel (TSH, B12, folate)  - supportive care.   - F/u Neurology.     Elevated BP likely 2/2 anxiety  - monitor BP closely. may start on low dose med if SBP persistently > 150.    Anxiety/Mild dementia - check reversible dementia panel. F/u neuro outpatient.     DVT ppx: Lovenox SC  GI ppx: not indicated.    Diet: Regular diet  Activity: as tolerated.     Date seen by the attendin2024  Total time spent: 75 minutes.

## 2024-05-12 NOTE — ED PROVIDER NOTE - PRINCIPAL DIAGNOSIS
AMS (altered mental status) Was The Patient On Physician Recommended Anticoagulation Therapy?: Please Select the Appropriate Response

## 2024-05-12 NOTE — ED ADULT NURSE NOTE - CCCP TRG CHIEF CMPLNT
numbness Gen: Awake, NAD, speaking in complete sentences  Head: NC, AT, EOMI, normal lids/conjunctiva  ENT: normal hearing, patent oropharynx  Neck: supple, no tenderness/meningismus, FROM, Trachea midline  Pulm: deferred, COVID PUI  CV: RRR, +dist pulses  Abd: soft, ND, no guarding/rebound tenderness  Mskel: no edema/erythema/cyanosis  Skin: no rash  Neuro: AAOx3, no sensory/motor deficits

## 2024-05-12 NOTE — ED PROVIDER NOTE - OBJECTIVE STATEMENT
75yo female PMHx anxiety presenting with left sided facial numbness and AMS; family began noticing yesterday she was asking about family members no longer present and not making much sense; LKW 20:00. This morning began c/o left sided facial "numbness"adn chest "tigthness". 75yo female PMHx anxiety presenting to the ED complaining of AMS and numbness. Family at bedside. State pt started to become confused around 8pm yesterday. Pt was not making sense and asking about family members who have passed. Pt was also confused about family being in her home where they live. Pt went to bed and this morning when the patient woke up she started to complain about left sided facial numbness and chest tightness. Upon arrival to the ED, stroke code was activated.

## 2024-05-12 NOTE — H&P ADULT - ASSESSMENT
73yo female PMHx anxiety and mild dementia presenting to the ED complaining of AMS and numbness. Family at bedside. State pt started to become confused around 8pm yesterday. Pt was not making sense and asking about family members who have passed. Pt was also confused about family being in her home where they live. Pt went to bed and this morning when the patient woke up she started to complain about left sided facial numbness and chest tightness. Pt currently asking daughter what happened to her yesterday as she has no recollection. Currently patient denies all neurologic complaints. Of significance, pt has had similar episode in the past 15 yrs ago when her son got into legal trouble and she felt extreme stress. Pt sees psychiatrist on outside but does not take meds as prescribed. Upon arrival to the ED, stroke code was activated. All findings were negative. Pt denies chest pain, SOB, abd pain, urinary symptoms.     In ED T(C): 36.6 (05-12-24 @ 13:53), Max: 36.6 (05-12-24 @ 13:53)  T(F): 97.8 (05-12-24 @ 13:53), Max: 97.8 (05-12-24 @ 13:53)  HR: 84 (05-12-24 @ 16:30) (84 - 87)  BP: 164/73 (05-12-24 @ 16:30) (164/73 - 196/82)  RR: 18 (05-12-24 @ 16:30) (18 - 18)  SpO2: 99% (05-12-24 @ 16:30) (99% - 99%)  Wt(kg): --  Labs UA- LE and bacteria, WBC- 10   Imaging all stroke protocol CT findings of head negative  NIH score 1 so no tpa indicated  received ceftriaxone in the ED    #Brief psychotic disorder  - psych consult  - UA not infectious    #HTN  - not on meds at home, BP taken was when pt stressed, f/u repeats and treat appropriately    #Mild dementia  - neuro f/u for outpt recs    DVT proph- lovenox  Diet- Reg  Act order-   AM labs       73yo female PMHx anxiety and mild dementia presenting to the ED complaining of AMS and numbness. Family at bedside. State pt started to become confused around 8pm yesterday. Pt was not making sense and asking about family members who have passed. Pt was also confused about family being in her home where they live. Pt went to bed and this morning when the patient woke up she started to complain about left sided facial numbness and chest tightness. Pt currently asking daughter what happened to her yesterday as she has no recollection. Currently patient denies all neurologic complaints. Of significance, pt has had similar episode in the past 15 yrs ago when her son got into legal trouble and she felt extreme stress. Pt sees psychiatrist on outside but does not take meds as prescribed. Upon arrival to the ED, stroke code was activated. All findings were negative. Pt denies chest pain, SOB, abd pain, urinary symptoms.     In ED T(C): 36.6 (05-12-24 @ 13:53), Max: 36.6 (05-12-24 @ 13:53)  T(F): 97.8 (05-12-24 @ 13:53), Max: 97.8 (05-12-24 @ 13:53)  HR: 84 (05-12-24 @ 16:30) (84 - 87)  BP: 164/73 (05-12-24 @ 16:30) (164/73 - 196/82)  RR: 18 (05-12-24 @ 16:30) (18 - 18)  SpO2: 99% (05-12-24 @ 16:30) (99% - 99%)  Wt(kg): --  Labs UA- LE and bacteria, WBC- 10   Imaging all stroke protocol CT findings of head negative  NIH score 1 so no tpa indicated  received ceftriaxone in the ED    #Brief psychotic disorder  - a disorder where acute stress causes psychosis  - psych consult- not done on weekends so call tomorrow  - pt and family interested in establishing care with psychiatry in [place] and will comply with meds  - UA not infectious  - lorazepam 0.5 prn for anxiety    #HTN  - not on meds at home, BP taken was when pt stressed, f/u repeats and treat appropriately    #Mild dementia  - neuro f/u for outpt recs    DVT proph- lovenox  Diet- Reg  Act order-   AM labs

## 2024-05-12 NOTE — PATIENT PROFILE ADULT - FALL HARM RISK - HARM RISK INTERVENTIONS
Assistance with ambulation/Assistance OOB with selected safe patient handling equipment/Communicate Risk of Fall with Harm to all staff/Monitor gait and stability/Reinforce activity limits and safety measures with patient and family/Sit up slowly, dangle for a short time, stand at bedside before walking/Tailored Fall Risk Interventions/Visual Cue: Yellow wristband and red socks/Bed in lowest position, wheels locked, appropriate side rails in place/Call bell, personal items and telephone in reach/Instruct patient to call for assistance before getting out of bed or chair/Non-slip footwear when patient is out of bed/[place] to call system/Physically safe environment - no spills, clutter or unnecessary equipment/Purposeful Proactive Rounding/Room/bathroom lighting operational, light cord in reach

## 2024-05-12 NOTE — ED PROVIDER NOTE - CLINICAL SUMMARY MEDICAL DECISION MAKING FREE TEXT BOX
Patient presented with acute onset of confusion and reported numbness as documented to began prior to arrival.  On arrival, patient with an NIH of 1 and therefore code stroke was called from triage.  On arrival to the critical care area, patient hemodynamically stable.  In conjunction with neurology, obtained emergent head CT as well as CTA head neck and CT perfusion which was negative for emergent cerebrovascular pathologies.  Per telestroke, no indication for TNK at this time and no LVO and therefore patient does not interventional candidate.  Obtained labs which were grossly unremarkable including no significant leukocytosis, anemia, signs of dehydration/ELIO, transaminitis or significant electrolyte abnormalities.  UA however showed likely UTI.  Patient remained stable during ED course, however given the above, patient will require admission with IV antibiotics for further workup and management.  Hemodynamically stable at time of admission.

## 2024-05-12 NOTE — CONSULT NOTE ADULT - ASSESSMENT
ASSESSMENT/PLAN:   74y Female with PMHx of anxiety presents for AMS and numbness on left side of face and left arm. As per family, pt was in her usual state of health until 7pm yesturday when she became acutely altered. Per daughter, pt was hallucinating asking about dead family members. Pt went to sleep and woke up endorsing left sided facial and arm numbness described as pins and needles sensation. Pt also was endorsing chest tightness. Family endorses pt continues to be confused asking what happened yesterday. Of note, as per daughter, pts anxiety mainifests as numbness on one side of the body, and pt had family friend pass away 3d ago.  NIH was one for pt not oriented to time. Of note, no objective decreased sensation on exam. CTH and CTA reported no acute pathology. UA was positive. Suspect AMS likely due to UTI, subjective numbness and chest tightness can be attributed to anxiety/panic disorder, rule out acute neurovascular cause.       RECS  - Obtain infectious workup  - medical management per primary team  - MRI brain non con    Discussed w Dr. Hsu     ASSESSMENT/PLAN:   74y Female with PMHx of anxiety presents for AMS and numbness on left side of face and left arm. As per family, pt was in her usual state of health until 7pm yesturday when she became acutely altered. Per daughter, pt was hallucinating asking about dead family members. Pt went to sleep and woke up endorsing left sided facial and arm numbness described as pins and needles sensation. Pt also was endorsing chest tightness. Family endorses pt continues to be confused asking what happened yesterday. Of note, as per daughter, pts anxiety mainifests as numbness on one side of the body, and pt had family friend pass away 3d ago.  NIH was one for pt not oriented to time. Of note, no objective decreased sensation on exam. CTH and CTA reported no acute pathology. UA was positive. Suspect AMS likely due to UTI, subjective numbness and chest tightness can be attributed to anxiety/panic disorder.   Of note, according to medical team who spoke to family, pt has history of acute mental status changes in setting of stressful situations, (pt had legal trouble years ago and had similar sx). Additionally, primary team reports family states she is baseline A&O x2, unknown if pt has dementia, but pt is on Donepezil per psychiatrist.     RECS  - medical management per primary team  - can f/u out-pt w gen neuro for dementia workup     Discussed w Dr. Hsu     ASSESSMENT/PLAN:   74y Female with PMHx of anxiety presents for AMS and numbness on left side of face and left arm. As per family, pt was in her usual state of health until 7pm yesterday when she became acutely altered. Per daughter, pt was hallucinating asking about dead family members. Pt went to sleep and woke up endorsing left sided facial and arm numbness described as pins and needles sensation. Pt also was endorsing chest tightness. Family endorses pt continues to be confused asking what happened yesterday. Of note, as per daughter, pts anxiety manifests as numbness on one side of the body, and pt had family friend pass away 3d ago.  NIH was one for pt not oriented to time. Of note, no objective decreased sensation on exam. CTH and CTA reported no acute pathology. UA was positive. Suspect AMS likely due to UTI, subjective numbness and chest tightness can be attributed to anxiety/panic disorder.   Of note, according to medical team who spoke to family, pt has history of acute mental status changes in setting of stressful situations, (pt had legal trouble years ago and had similar sx). Additionally, primary team reports family states she is baseline A&O x2, unknown if pt has dementia, but pt is on Donepezil per psychiatrist.     RECS  - medical management per primary team  - can f/u out-pt w gen neuro for dementia workup     Discussed w Dr. Hsu

## 2024-05-12 NOTE — ED ADULT TRIAGE NOTE - CHIEF COMPLAINT QUOTE
As per family patient recently loss a family member and appeared to have anxiety attack yesterday, at 8 PM they found that she was not acting like herself. This mrning at 10 AM when she woke up she had new left sided facial numbness and left sided heaviness. No other neuro motor deficits noted

## 2024-05-12 NOTE — H&P ADULT - HISTORY OF PRESENT ILLNESS
73yo female PMHx anxiety presenting to the ED complaining of AMS and numbness. Family at bedside. State pt started to become confused around 8pm yesterday. Pt was not making sense and asking about family members who have passed. Pt was also confused about family being in her home where they live. Pt went to bed and this morning when the patient woke up she started to complain about left sided facial numbness and chest tightness. Upon arrival to the ED, stroke code was activated.    In ED T(C): 36.6 (05-12-24 @ 13:53), Max: 36.6 (05-12-24 @ 13:53)  T(F): 97.8 (05-12-24 @ 13:53), Max: 97.8 (05-12-24 @ 13:53)  HR: 84 (05-12-24 @ 16:30) (84 - 87)  BP: 164/73 (05-12-24 @ 16:30) (164/73 - 196/82)  RR: 18 (05-12-24 @ 16:30) (18 - 18)  SpO2: 99% (05-12-24 @ 16:30) (99% - 99%)  Wt(kg): --  Labs UA- LE and bacteria, WBC- 10   Imaging all stroke protocol CT findings of head negative  NIH score 1 so no tpa indicated  received ceftriaxone in the ED   73yo female PMHx anxiety and mild dementia presenting to the ED complaining of AMS and numbness. Family at bedside. State pt started to become confused around 8pm yesterday. Pt was not making sense and asking about family members who have passed. Pt was also confused about family being in her home where they live. Pt went to bed and this morning when the patient woke up she started to complain about left sided facial numbness and chest tightness. Pt currently asking daughter what happened to her yesterday as she has no recollection. Currently patient denies all neurologic complaints. Of significance, pt has had similar episode in the past 15 yrs ago when her son got into legal trouble and she felt extreme stress. Upon arrival to the ED, stroke code was activated. All findings were negative. Pt denies chest pain, SOB, abd pain, urinary symptoms.     In ED T(C): 36.6 (05-12-24 @ 13:53), Max: 36.6 (05-12-24 @ 13:53)  T(F): 97.8 (05-12-24 @ 13:53), Max: 97.8 (05-12-24 @ 13:53)  HR: 84 (05-12-24 @ 16:30) (84 - 87)  BP: 164/73 (05-12-24 @ 16:30) (164/73 - 196/82)  RR: 18 (05-12-24 @ 16:30) (18 - 18)  SpO2: 99% (05-12-24 @ 16:30) (99% - 99%)  Wt(kg): --  Labs UA- LE and bacteria, WBC- 10   Imaging all stroke protocol CT findings of head negative  NIH score 1 so no tpa indicated  received ceftriaxone in the ED

## 2024-05-12 NOTE — ED PROVIDER NOTE - EKG #1 DATE/TIME
Case Management Discharge Note      Final Note: Home with family/friend support. No d/c orders for DME/RH/HH noted.         Selected Continued Care - Discharged on 5/2/2021 Admission date: 4/30/2021 - Discharge disposition: Home or Self Care    Destination    No services have been selected for the patient.              Durable Medical Equipment    No services have been selected for the patient.              Dialysis/Infusion    No services have been selected for the patient.              Home Medical Care    No services have been selected for the patient.              Therapy    No services have been selected for the patient.              Community Resources    No services have been selected for the patient.                Selected Continued Care - Prior Encounters Includes selections from prior encounters from 1/30/2021 to 5/2/2021    Discharged on 4/27/2021 Admission date: 4/26/2021 - Discharge disposition: Home or Self Care    Home Medical Care     Service Provider Selected Services Address Phone Fax Patient Preferred    [facility]  [facility] [address] [place] [phone] --                         Final Discharge Disposition Code: 01 - home or self-care  
12-May-2024 15:09

## 2024-05-12 NOTE — H&P ADULT - NSHPPHYSICALEXAM_GEN_ALL_CORE
LOS:     VITALS:   T(C): 36.6 (05-12-24 @ 13:53), Max: 36.6 (05-12-24 @ 13:53)  HR: 84 (05-12-24 @ 16:30) (84 - 87)  BP: 164/73 (05-12-24 @ 16:30) (164/73 - 196/82)  RR: 18 (05-12-24 @ 16:30) (18 - 18)  SpO2: 99% (05-12-24 @ 16:30) (99% - 99%)    GENERAL: NAD, lying in bed comfortably  HEAD:  Atraumatic, Normocephalic  EYES: EOMI, PERRLA, conjunctiva and sclera clear  ENT: Moist mucous membranes  NECK: Supple, No JVD  CHEST/LUNG: Clear to auscultation bilaterally; No rales, rhonchi, wheezing, or rubs. Unlabored respirations  HEART: Regular rate and rhythm; No murmurs, rubs, or gallops  ABDOMEN: BSx4; Soft, nontender, nondistended  EXTREMITIES:  2+ Peripheral Pulses, brisk capillary refill. No clubbing, cyanosis, or edema  NERVOUS SYSTEM:  A&Ox2 at baseline, no focal deficits   SKIN: No rashes or lesions

## 2024-05-13 ENCOUNTER — TRANSCRIPTION ENCOUNTER (OUTPATIENT)
Age: 74
End: 2024-05-13

## 2024-05-13 LAB
FOLATE SERPL-MCNC: 13.3 NG/ML — SIGNIFICANT CHANGE UP
HCT VFR BLD CALC: 43.4 % — SIGNIFICANT CHANGE UP (ref 37–47)
HGB BLD-MCNC: 14.6 G/DL — SIGNIFICANT CHANGE UP (ref 12–16)
MAGNESIUM SERPL-MCNC: 2 MG/DL — SIGNIFICANT CHANGE UP (ref 1.8–2.4)
MCHC RBC-ENTMCNC: 29 PG — SIGNIFICANT CHANGE UP (ref 27–31)
MCHC RBC-ENTMCNC: 33.6 G/DL — SIGNIFICANT CHANGE UP (ref 32–37)
MCV RBC AUTO: 86.3 FL — SIGNIFICANT CHANGE UP (ref 81–99)
NRBC # BLD: 0 /100 WBCS — SIGNIFICANT CHANGE UP (ref 0–0)
PLATELET # BLD AUTO: 203 K/UL — SIGNIFICANT CHANGE UP (ref 130–400)
PMV BLD: 10.2 FL — SIGNIFICANT CHANGE UP (ref 7.4–10.4)
RBC # BLD: 5.03 M/UL — SIGNIFICANT CHANGE UP (ref 4.2–5.4)
RBC # FLD: 12.8 % — SIGNIFICANT CHANGE UP (ref 11.5–14.5)
TSH SERPL-MCNC: 1.6 UIU/ML — SIGNIFICANT CHANGE UP (ref 0.27–4.2)
VIT B12 SERPL-MCNC: 472 PG/ML — SIGNIFICANT CHANGE UP (ref 232–1245)
WBC # BLD: 6.55 K/UL — SIGNIFICANT CHANGE UP (ref 4.8–10.8)
WBC # FLD AUTO: 6.55 K/UL — SIGNIFICANT CHANGE UP (ref 4.8–10.8)

## 2024-05-13 PROCEDURE — 70551 MRI BRAIN STEM W/O DYE: CPT | Mod: 26

## 2024-05-13 PROCEDURE — 95816 EEG AWAKE AND DROWSY: CPT | Mod: 26

## 2024-05-13 PROCEDURE — 99222 1ST HOSP IP/OBS MODERATE 55: CPT

## 2024-05-13 PROCEDURE — 99233 SBSQ HOSP IP/OBS HIGH 50: CPT

## 2024-05-13 RX ORDER — POLYETHYLENE GLYCOL 3350 17 G/17G
17 POWDER, FOR SOLUTION ORAL
Refills: 0 | Status: DISCONTINUED | OUTPATIENT
Start: 2024-05-13 | End: 2024-05-16

## 2024-05-13 RX ORDER — DONEPEZIL HYDROCHLORIDE 10 MG/1
5 TABLET, FILM COATED ORAL AT BEDTIME
Refills: 0 | Status: DISCONTINUED | OUTPATIENT
Start: 2024-05-13 | End: 2024-05-16

## 2024-05-13 RX ORDER — MIRTAZAPINE 45 MG/1
7.5 TABLET, ORALLY DISINTEGRATING ORAL AT BEDTIME
Refills: 0 | Status: DISCONTINUED | OUTPATIENT
Start: 2024-05-13 | End: 2024-05-16

## 2024-05-13 RX ORDER — ATORVASTATIN CALCIUM 80 MG/1
80 TABLET, FILM COATED ORAL AT BEDTIME
Refills: 0 | Status: DISCONTINUED | OUTPATIENT
Start: 2024-05-13 | End: 2024-05-14

## 2024-05-13 RX ORDER — PANTOPRAZOLE SODIUM 20 MG/1
40 TABLET, DELAYED RELEASE ORAL
Refills: 0 | Status: DISCONTINUED | OUTPATIENT
Start: 2024-05-13 | End: 2024-05-16

## 2024-05-13 RX ORDER — SERTRALINE 25 MG/1
50 TABLET, FILM COATED ORAL DAILY
Refills: 0 | Status: DISCONTINUED | OUTPATIENT
Start: 2024-05-13 | End: 2024-05-16

## 2024-05-13 RX ORDER — SENNA PLUS 8.6 MG/1
2 TABLET ORAL AT BEDTIME
Refills: 0 | Status: DISCONTINUED | OUTPATIENT
Start: 2024-05-13 | End: 2024-05-16

## 2024-05-13 RX ORDER — ASPIRIN/CALCIUM CARB/MAGNESIUM 324 MG
81 TABLET ORAL DAILY
Refills: 0 | Status: DISCONTINUED | OUTPATIENT
Start: 2024-05-13 | End: 2024-05-16

## 2024-05-13 RX ADMIN — Medication 2 MILLIGRAM(S): at 21:08

## 2024-05-13 RX ADMIN — DONEPEZIL HYDROCHLORIDE 5 MILLIGRAM(S): 10 TABLET, FILM COATED ORAL at 21:09

## 2024-05-13 RX ADMIN — ATORVASTATIN CALCIUM 80 MILLIGRAM(S): 80 TABLET, FILM COATED ORAL at 21:09

## 2024-05-13 RX ADMIN — SERTRALINE 50 MILLIGRAM(S): 25 TABLET, FILM COATED ORAL at 13:44

## 2024-05-13 RX ADMIN — Medication 81 MILLIGRAM(S): at 13:45

## 2024-05-13 RX ADMIN — ENOXAPARIN SODIUM 40 MILLIGRAM(S): 100 INJECTION SUBCUTANEOUS at 21:09

## 2024-05-13 RX ADMIN — PANTOPRAZOLE SODIUM 40 MILLIGRAM(S): 20 TABLET, DELAYED RELEASE ORAL at 11:53

## 2024-05-13 RX ADMIN — MIRTAZAPINE 7.5 MILLIGRAM(S): 45 TABLET, ORALLY DISINTEGRATING ORAL at 21:09

## 2024-05-13 RX ADMIN — SENNA PLUS 2 TABLET(S): 8.6 TABLET ORAL at 21:10

## 2024-05-13 RX ADMIN — POLYETHYLENE GLYCOL 3350 17 GRAM(S): 17 POWDER, FOR SOLUTION ORAL at 17:29

## 2024-05-13 NOTE — PROGRESS NOTE ADULT - ASSESSMENT
73 yo F with hx of Anxiety and Mild dementia presents to ED for AMS and numbness.     #Transient confusion - resolved  # H/o Anxiety/Panic disorder / Mild dementia  #DDx Global transient amnesia vs Episode of severe anxiety  - s/p stroke code and neuro eval in ED   - CTH: neg for acute intracranial pathology.   - CTP: No evidence of perfusion abnormality.  - CTA H & N: No evidence of intracranial vessel occlusion or aneurysm. No evidence of greater than 50% carotid or vertebral artery stenosis.  - UA positive>>will monitor off Abx as no urinary symptoms   - F/U TSH, B12, Folate  -Neuro checks per routine  -F/U MRI brain  -f/u rEEG  -Aspirin and Atorvastatin started   - resume home meds  -check LDL    #Dizziness  check orthostatics  pt/daughter report chronic symptoms when OOB Ax w/ low BP in pt/daughter and son    #MISC:  -DVT ppx: Lovenox SC  -GI ppx: None    -Diet: Regular  -Activity: as tolerated    #Progress Note Handoff  Pending: Clinical improvement and stability__x___PT____x___MRI, Orthostatics_  Pt/Family discussion: Pt/family informed and agree with the current plan  Disposition: Home___    My note supersedes the residents note should a discrepancy arise.    Chart and notes personally reviewed.  Care Discussed with Consultants/Other Providers/ Housestaff [ x] YES [ ] NO   Radiology, labs, old records personally reviewed.    discussed w/ housestaff, nursing, case management, neuro team, pt's family    Time-based billing (NON-critical care).     50 minutes spent on total encounter. The necessity of the time spent during the encounter on this date of service was due to:     time spent on review of labs, imaging studies, old records, obtaining history, personally examining patient, counselling and communicating with patient/ family, entering orders for medications/tests/etc, discussions with other health care providers, documentation in electronic health records, independent interpretation of labs, imaging/procedure results and care coordination.

## 2024-05-13 NOTE — DISCHARGE NOTE NURSING/CASE MANAGEMENT/SOCIAL WORK - PATIENT PORTAL LINK FT
You can access the FollowMyHealth Patient Portal offered by [facility] by registering at the following website: [email or web address]. By joining [facility]’s FollowMyHealth portal, you will also be able to view your health information using other applications (apps) compatible with our system.

## 2024-05-13 NOTE — PROGRESS NOTE ADULT - ASSESSMENT
75 yo F with hx of Anxiety and Mild dementia presents to ED for AMS and numbness.     #AMS, transient- resolved   - Ddx: UTI vs. Anxiety/Panic disorder   - s/p stroke code and neuro eval in ED   - CTH: neg for acute intracranial pathology.   - CTP: No evidence of perfusion abnormality.  - CTA H & N: No evidence of intracranial vessel occlusion or aneurysm. No evidence of greater than 50% carotid or vertebral artery stenosis.  - UA positive. Will treat with ceftriaxone. F/u UCx.   - F/U TSH, B12, Folate  -Neuro checks per routine    #Anxiety  #Dementia   -neuro Follow up outpatient.     #MISC:  -DVT ppx: Lovenox SC  -GI ppx: None    -Diet: Regular  -Activity: as tolerated  -Dispo: D/C planning   73 yo F with hx of Anxiety and Mild dementia presents to ED for AMS and numbness.     #AMS, transient- resolved   - Ddx: Anxiety/Panic disorder   - s/p stroke code and neuro eval in ED   - CTH: neg for acute intracranial pathology.   - CTP: No evidence of perfusion abnormality.  - CTA H & N: No evidence of intracranial vessel occlusion or aneurysm. No evidence of greater than 50% carotid or vertebral artery stenosis.  - UA positive>>will monitor off Abx as no urinary symptoms   -F/u UCx.   - F/U TSH, B12, Folate  -Neuro checks per routine  -F/U MRI brain  -f/u rEEG  -Aspirin and Atorvastatin started   -  #Anxiety  #Dementia   -neuro Follow up outpatient.     #MISC:  -DVT ppx: Lovenox SC  -GI ppx: None    -Diet: Regular  -Activity: as tolerated  -Dispo: D/C planning

## 2024-05-13 NOTE — DISCHARGE NOTE NURSING/CASE MANAGEMENT/SOCIAL WORK - NSDCPEFALRISK_GEN_ALL_CORE
For information on Fall & Injury Prevention, visit: [email or web address] OR  [email or web address] OR  https://www.cdc.gov/steadi/patient.html

## 2024-05-14 ENCOUNTER — TRANSCRIPTION ENCOUNTER (OUTPATIENT)
Age: 74
End: 2024-05-14

## 2024-05-14 LAB
CHOLEST SERPL-MCNC: 207 MG/DL — HIGH
CULTURE RESULTS: SIGNIFICANT CHANGE UP
HDLC SERPL-MCNC: 32 MG/DL — LOW
LIPID PNL WITH DIRECT LDL SERPL: 111 MG/DL — HIGH
NON HDL CHOLESTEROL: 175 MG/DL — HIGH
SPECIMEN SOURCE: SIGNIFICANT CHANGE UP
T PALLIDUM AB TITR SER: NEGATIVE — SIGNIFICANT CHANGE UP
TRIGL SERPL-MCNC: 320 MG/DL — HIGH

## 2024-05-14 PROCEDURE — 99233 SBSQ HOSP IP/OBS HIGH 50: CPT

## 2024-05-14 RX ORDER — DIPHENHYDRAMINE HCL 50 MG
25 CAPSULE ORAL ONCE
Refills: 0 | Status: DISCONTINUED | OUTPATIENT
Start: 2024-05-14 | End: 2024-05-14

## 2024-05-14 RX ORDER — ASPIRIN/CALCIUM CARB/MAGNESIUM 324 MG
1 TABLET ORAL
Qty: 0 | Refills: 0 | DISCHARGE
Start: 2024-05-14

## 2024-05-14 RX ORDER — SODIUM CHLORIDE 9 MG/ML
1000 INJECTION INTRAMUSCULAR; INTRAVENOUS; SUBCUTANEOUS
Refills: 0 | Status: DISCONTINUED | OUTPATIENT
Start: 2024-05-14 | End: 2024-05-16

## 2024-05-14 RX ORDER — DONEPEZIL HYDROCHLORIDE 10 MG/1
1 TABLET, FILM COATED ORAL
Qty: 15 | Refills: 0
Start: 2024-05-14 | End: 2024-05-28

## 2024-05-14 RX ORDER — SENNA PLUS 8.6 MG/1
2 TABLET ORAL
Qty: 14 | Refills: 0
Start: 2024-05-14 | End: 2024-05-20

## 2024-05-14 RX ORDER — SENNA PLUS 8.6 MG/1
2 TABLET ORAL
Qty: 0 | Refills: 0 | DISCHARGE
Start: 2024-05-14

## 2024-05-14 RX ORDER — SODIUM CHLORIDE 9 MG/ML
500 INJECTION INTRAMUSCULAR; INTRAVENOUS; SUBCUTANEOUS ONCE
Refills: 0 | Status: DISCONTINUED | OUTPATIENT
Start: 2024-05-14 | End: 2024-05-16

## 2024-05-14 RX ORDER — ASPIRIN/CALCIUM CARB/MAGNESIUM 324 MG
1 TABLET ORAL
Qty: 30 | Refills: 0
Start: 2024-05-14 | End: 2024-06-12

## 2024-05-14 RX ORDER — SODIUM CHLORIDE 9 MG/ML
500 INJECTION INTRAMUSCULAR; INTRAVENOUS; SUBCUTANEOUS
Refills: 0 | Status: COMPLETED | OUTPATIENT
Start: 2024-05-14 | End: 2024-05-14

## 2024-05-14 RX ORDER — DONEPEZIL HYDROCHLORIDE 10 MG/1
1 TABLET, FILM COATED ORAL
Qty: 0 | Refills: 0 | DISCHARGE
Start: 2024-05-14

## 2024-05-14 RX ORDER — ATORVASTATIN CALCIUM 80 MG/1
1 TABLET, FILM COATED ORAL
Qty: 0 | Refills: 0 | DISCHARGE
Start: 2024-05-14

## 2024-05-14 RX ORDER — SODIUM CHLORIDE 9 MG/ML
500 INJECTION INTRAMUSCULAR; INTRAVENOUS; SUBCUTANEOUS ONCE
Refills: 0 | Status: DISCONTINUED | OUTPATIENT
Start: 2024-05-14 | End: 2024-05-14

## 2024-05-14 RX ORDER — MIRTAZAPINE 45 MG/1
1 TABLET, ORALLY DISINTEGRATING ORAL
Qty: 0 | Refills: 0 | DISCHARGE
Start: 2024-05-14

## 2024-05-14 RX ORDER — DIPHENHYDRAMINE HCL 50 MG
25 CAPSULE ORAL EVERY 6 HOURS
Refills: 0 | Status: DISCONTINUED | OUTPATIENT
Start: 2024-05-14 | End: 2024-05-16

## 2024-05-14 RX ORDER — SERTRALINE 25 MG/1
1 TABLET, FILM COATED ORAL
Qty: 0 | Refills: 0 | DISCHARGE
Start: 2024-05-14

## 2024-05-14 RX ADMIN — SODIUM CHLORIDE 250 MILLILITER(S): 9 INJECTION INTRAMUSCULAR; INTRAVENOUS; SUBCUTANEOUS at 12:05

## 2024-05-14 RX ADMIN — Medication 81 MILLIGRAM(S): at 12:05

## 2024-05-14 RX ADMIN — MIRTAZAPINE 7.5 MILLIGRAM(S): 45 TABLET, ORALLY DISINTEGRATING ORAL at 21:07

## 2024-05-14 RX ADMIN — SODIUM CHLORIDE 100 MILLILITER(S): 9 INJECTION INTRAMUSCULAR; INTRAVENOUS; SUBCUTANEOUS at 17:48

## 2024-05-14 RX ADMIN — SODIUM CHLORIDE 250 MILLILITER(S): 9 INJECTION INTRAMUSCULAR; INTRAVENOUS; SUBCUTANEOUS at 17:49

## 2024-05-14 RX ADMIN — SENNA PLUS 2 TABLET(S): 8.6 TABLET ORAL at 21:07

## 2024-05-14 RX ADMIN — POLYETHYLENE GLYCOL 3350 17 GRAM(S): 17 POWDER, FOR SOLUTION ORAL at 17:49

## 2024-05-14 RX ADMIN — ENOXAPARIN SODIUM 40 MILLIGRAM(S): 100 INJECTION SUBCUTANEOUS at 21:07

## 2024-05-14 RX ADMIN — SERTRALINE 50 MILLIGRAM(S): 25 TABLET, FILM COATED ORAL at 12:04

## 2024-05-14 RX ADMIN — Medication 25 MILLIGRAM(S): at 17:48

## 2024-05-14 RX ADMIN — PANTOPRAZOLE SODIUM 40 MILLIGRAM(S): 20 TABLET, DELAYED RELEASE ORAL at 05:03

## 2024-05-14 RX ADMIN — DONEPEZIL HYDROCHLORIDE 5 MILLIGRAM(S): 10 TABLET, FILM COATED ORAL at 21:07

## 2024-05-14 NOTE — DISCHARGE NOTE PROVIDER - HOSPITAL COURSE
75yo female PMHx anxiety and mild dementia presented to the ED complaining of AMS and numbness. Family at bedside. Stated pt started to become confused around 8pm the day before presentation. Pt was not making sense and asking about family members who have passed. Pt was also confused about family being in her home where they live. When the patient woke up in AM, she started to complain about left sided facial numbness and chest tightness. Pt was asking her daughter what happened to her yesterday as she has no recollection. Of significance, pt has had similar episode in the past 15 yrs ago when her son got into legal trouble and she felt extreme stress. Upon arrival to the ED, stroke code was activated. All findings were negative. Pt denies chest pain, SOB, abd pain, urinary symptoms.     In ED T(C): 36.6 (05-12-24 @ 13:53), Max: 36.6 (05-12-24 @ 13:53)  T(F): 97.8 (05-12-24 @ 13:53), Max: 97.8 (05-12-24 @ 13:53)  HR: 84 (05-12-24 @ 16:30) (84 - 87)  BP: 164/73 (05-12-24 @ 16:30) (164/73 - 196/82)  RR: 18 (05-12-24 @ 16:30) (18 - 18)  SpO2: 99% (05-12-24 @ 16:30) (99% - 99%)  Labs UA- LE and bacteria, WBC- 10   Imaging all stroke protocol CT findings of head negative  NIH score 1 so no tpa indicated  received ceftriaxone in the ED    The hospital course also included the following:   For H/o Anxiety/Panic disorder / Mild dementia, TSH, B12, Folate were followed and were WNL. Neuro checks per routine was continued. Neurology was consulted and recommended  MRI brain, rEEG, Aspirin and statin treatment. MRI showed.......  Orthostatics vitals was checked which was WNL.   Other home meds were continued. Labs were followed. Vitals wee monitored. The patient is clinically stable, and can be discharged with the advise to follow up with PCP and Neurology outpatient.    73yo female PMHx anxiety and mild dementia presented to the ED complaining of AMS and numbness. Family at bedside. Stated pt started to become confused around 8pm the day before presentation. Pt was not making sense and asking about family members who have passed. Pt was also confused about family being in her home where they live. When the patient woke up in AM, she started to complain about left sided facial numbness and chest tightness. Pt was asking her daughter what happened to her yesterday as she has no recollection. Of significance, pt has had similar episode in the past 15 yrs ago when her son got into legal trouble and she felt extreme stress. Upon arrival to the ED, stroke code was activated. All findings were negative. Pt denies chest pain, SOB, abd pain, urinary symptoms.     In ED T(C): 36.6 (05-12-24 @ 13:53), Max: 36.6 (05-12-24 @ 13:53)  T(F): 97.8 (05-12-24 @ 13:53), Max: 97.8 (05-12-24 @ 13:53)  HR: 84 (05-12-24 @ 16:30) (84 - 87)  BP: 164/73 (05-12-24 @ 16:30) (164/73 - 196/82)  RR: 18 (05-12-24 @ 16:30) (18 - 18)  SpO2: 99% (05-12-24 @ 16:30) (99% - 99%)  Labs UA- LE and bacteria, WBC- 10   Imaging all stroke protocol CT findings of head negative  NIH score 1 so no tpa indicated  received ceftriaxone in the ED    The hospital course also included the following:   For H/o Anxiety/Panic disorder / Mild dementia, TSH, B12, Folate were followed and were WNL. Neuro checks per routine was continued. Neurology was consulted and recommended  MRI brain, rEEG, Aspirin and statin treatment. MRI brain showed no MRI evidence to suggest acute ischemic change.    Orthostatics vitals was checked which was WNL.   Other home meds were continued. Labs were followed. Vitals wee monitored. The patient is clinically stable, and can be discharged with the advise to follow up with PCP and Neurology outpatient.    75yo female PMHx anxiety and mild dementia presented to the ED complaining of AMS and numbness. Family at bedside. Stated pt started to become confused around 8pm the day before presentation. Pt was not making sense and asking about family members who have passed. Pt was also confused about family being in her home where they live. When the patient woke up in AM, she started to complain about left sided facial numbness and chest tightness. Pt was asking her daughter what happened to her yesterday as she has no recollection. Of significance, pt has had similar episode in the past 15 yrs ago when her son got into legal trouble and she felt extreme stress. Upon arrival to the ED, stroke code was activated. All findings were negative. Pt denies chest pain, SOB, abd pain, urinary symptoms.     In ED T(C): 36.6 (05-12-24 @ 13:53), Max: 36.6 (05-12-24 @ 13:53)  T(F): 97.8 (05-12-24 @ 13:53), Max: 97.8 (05-12-24 @ 13:53)  HR: 84 (05-12-24 @ 16:30) (84 - 87)  BP: 164/73 (05-12-24 @ 16:30) (164/73 - 196/82)  RR: 18 (05-12-24 @ 16:30) (18 - 18)  SpO2: 99% (05-12-24 @ 16:30) (99% - 99%)  Labs UA- LE and bacteria, WBC- 10   Imaging all stroke protocol CT findings of head negative  NIH score 1 so no tpa indicated  received ceftriaxone in the ED    The hospital course also included the following:   For H/o Anxiety/Panic disorder / Mild dementia, TSH, B12, Folate were followed and were WNL. Neuro checks per routine was continued. Neurology was consulted and recommended  MRI brain, rEEG, Aspirin and statin treatment. MRI brain showed no MRI evidence to suggest acute ischemic change.    Orthostatics vitals was checked which was positive, therefore IVF was administered. Education to avoid hypotension was provided.    Other home meds were continued. Labs were followed. Vitals wee monitored. The patient is clinically stable, and can be discharged with the advise to follow up with PCP and Neurology outpatient.

## 2024-05-14 NOTE — CHART NOTE - NSCHARTNOTEFT_GEN_A_CORE
MRI brain non con negative, patient's episode most likely correlating with transient global amnesia, will no longer warrant further neurological work up. Please call [phone] with any questions.     Discussed with Dr. Emery

## 2024-05-14 NOTE — PHYSICAL THERAPY INITIAL EVALUATION ADULT - SPECIFY REASON(S)
Pt requesting for daughter to be present for Hungarian translation. Pt educated on Language assist but still wants to have daughter present. Will f/u when she is in room.

## 2024-05-14 NOTE — DISCHARGE NOTE PROVIDER - NSDCCPCAREPLAN_GEN_ALL_CORE_FT
PRINCIPAL DISCHARGE DIAGNOSIS  Diagnosis: AMS (altered mental status)  Assessment and Plan of Treatment: You presented for evaluation of  AMS and left sided facial numbness and chest tightness.  Upon arrival to the ED, stroke code was activated. All findings were negative. Pt denies chest pain, SOB, abd pain, urinary symptoms. Imaging all stroke protocol CT findings of head negative  NIH score 1 so no tpa indicated. You received ceftriaxone in the ED  For H/o Anxiety/Panic disorder / Mild dementia, TSH, B12, Folate were followed and were WNL. Neuro checks per routine was continued. Neurology was consulted and recommended  MRI brain, rEEG, Aspirin and statin treatment. MRI showed.......  Orthostatics vitals was checked which was WNL.   Other home meds were continued. Labs were followed. Vitals wee monitored. The patient is clinically stable, and can be discharged with the advise to follow up with PCP and Neurology outpatient.         SECONDARY DISCHARGE DIAGNOSES  Diagnosis: Acute UTI  Assessment and Plan of Treatment:      PRINCIPAL DISCHARGE DIAGNOSIS  Diagnosis: AMS (altered mental status)  Assessment and Plan of Treatment: You presented for evaluation of  AMS and left sided facial numbness and chest tightness.  Upon arrival to the ED, stroke code was activated. All findings were negative. Pt denies chest pain, SOB, abd pain, urinary symptoms. Imaging all stroke protocol CT findings of head negative  NIH score 1 so no tpa indicated. You received ceftriaxone in the ED  For H/o Anxiety/Panic disorder / Mild dementia, TSH, B12, Folate were followed and were WNL. Neuro checks per routine was continued. Neurology was consulted and recommended  MRI brain, rEEG, Aspirin and statin treatment. MRI brain showed No MRI evidence to suggest acute ischemic change.  Orthostatics vitals was checked which was WNL.   Other home meds were continued. Labs were followed. Vitals wee monitored. The patient is clinically stable, and can be discharged with the advise to follow up with PCP and Neurology outpatient.         SECONDARY DISCHARGE DIAGNOSES  Diagnosis: Acute UTI  Assessment and Plan of Treatment:      PRINCIPAL DISCHARGE DIAGNOSIS  Diagnosis: AMS (altered mental status)  Assessment and Plan of Treatment: You presented for evaluation of  AMS and left sided facial numbness and chest tightness.  Upon arrival to the ED, stroke code was activated. All findings were negative. Pt denies chest pain, SOB, abd pain, urinary symptoms. Imaging all stroke protocol CT findings of head negative  NIH score 1 so no tpa indicated. You received ceftriaxone in the ED  For H/o Anxiety/Panic disorder / Mild dementia, TSH, B12, Folate were followed and were WNL. Neuro checks per routine was continued. Neurology was consulted and recommended  MRI brain, rEEG, Aspirin and statin treatment. MRI brain showed No MRI evidence to suggest acute ischemic change.  Orthostatics vitals was checked which was positive, therefore IVF was administered. Education to avoid hypotension was provided.   Other home meds were continued. Labs were followed. Vitals wee monitored. The patient is clinically stable, and can be discharged with the advise to follow up with PCP and Neurology outpatient.         SECONDARY DISCHARGE DIAGNOSES  Diagnosis: Acute UTI  Assessment and Plan of Treatment:      PRINCIPAL DISCHARGE DIAGNOSIS  Diagnosis: AMS (altered mental status)  Assessment and Plan of Treatment: You presented for evaluation of  AMS and left sided facial numbness.  Upon arrival to the ED, stroke code was activated. All findings were negative. MRI brain showed No MRI evidence to suggest acute ischemic change. Neurology suspect transient global amnesia caused by an emotional event. Please follow up with neurologist in the office.        SECONDARY DISCHARGE DIAGNOSES  Diagnosis: Orthostatic hypotension  Assessment and Plan of Treatment: Your reported chronic dizziness when getting out of bed. Orthostatics vitals were checked and were positive, therefore IVF was administered, stockings when out of bed and abdominal binder. Education to avoid hypotension was provided including CONI stockings, abdominal binder, hydration and using Midodrine as needed when BP is low or patient is feeling dizzy .   Please follow up with PCP and Neurology outpatient.        PRINCIPAL DISCHARGE DIAGNOSIS  Diagnosis: AMS (altered mental status)  Assessment and Plan of Treatment: You presented for evaluation of  AMS and left sided facial numbness.  Upon arrival to the ED, stroke code was activated. All findings were negative. MRI brain showed No MRI evidence to suggest acute ischemic change. Neurology suspect transient global amnesia caused by an emotional event. Please follow up with neurologist in the office.        SECONDARY DISCHARGE DIAGNOSES  Diagnosis: Orthostatic hypotension  Assessment and Plan of Treatment: Your reported chronic dizziness when getting out of bed. Orthostatics vitals were checked and were positive, therefore IVF was administered, stockings when out of bed and abdominal binder. Education to avoid hypotension was provided including CONI stockings, abdominal binder, hydration and using Midodrine as needed when BP is low or patient is feeling dizzy .   Please follow up with PCP and Neurology outpatient.       Diagnosis: Contact dermatitis  Assessment and Plan of Treatment: use a soap for people with sensitive skin like Dove or Aveeno  - can use Triamcinolone cream BID to affected areas

## 2024-05-14 NOTE — PROGRESS NOTE ADULT - ASSESSMENT
73 yo F with hx of Anxiety and Mild dementia presents to ED for AMS and numbness.     #Transient confusion - resolved  # H/o Anxiety/Panic disorder / Mild dementia  #Suspected Global transient amnesia   - s/p stroke code and neuro eval in ED   - CTH: neg for acute intracranial pathology.   - CTP: No evidence of perfusion abnormality.  - CTA H & N: No evidence of intracranial vessel occlusion or aneurysm. No evidence of greater than 50% carotid or vertebral artery stenosis.  - UA positive>>will monitor off Abx as no urinary symptoms  - MRI brain negative  -rEEG negative  -Aspirin and Atorvastatin started before MRI. WIll d/c as MRI negative    -    #Orthostatic Hypotension  pt/daughter report chronic symptoms when OOB Ax w/ low BP in pt/daughter and son  IVFs  thigh high TEDs  pt/family counselled about preventive measures  will start Midodrine if still orthostatic    #RUErash  ? due to Rocephin  Benadryl  check RUE U/s        #MISC:  -DVT ppx: Lovenox SC  -GI ppx: None    -Diet: Regular  -Activity: as tolerated    #Progress Note Handoff  Pending: Clinical improvement and stability__x___Resolution of OH, rash  Pt/Family discussion: Pt/family informed and agree with the current plan  Disposition: Home___    My note supersedes the residents note should a discrepancy arise.    Chart and notes personally reviewed.  Care Discussed with Consultants/Other Providers/ Housestaff [ x] YES [ ] NO   Radiology, labs, old records personally reviewed.    discussed w/ housestaff, nursing, case management, neuro team, 2 daughters    Time-based billing (NON-critical care).     50 minutes spent on total encounter. The necessity of the time spent during the encounter on this date of service was due to:     time spent on review of labs, imaging studies, old records, obtaining history, personally examining patient, counselling and communicating with patient/ family, entering orders for medications/tests/etc, discussions with other health care providers, documentation in electronic health records, independent interpretation of labs, imaging/procedure results and care coordination.

## 2024-05-14 NOTE — DISCHARGE NOTE PROVIDER - CARE PROVIDER_API CALL
Radha Salomon  Internal Medicine  [address] [place] [id number]  Phone: [phone]  Fax: ()-  Established Patient  Follow Up Time: 2 weeks    Marcela Emery  Neurology  [address] [place] [id number]  Phone: [phone]  Fax: [phone]  Established Patient  Follow Up Time: 1 month   Radha Salomon  Internal Medicine  [address] [place] [address]  Phone: [phone]  Fax: ()-  Established Patient  Follow Up Time: 1-3 days    Marcela Emery  Neurology  [address] [place] [id number]  Phone: [phone]  Fax: [phone]  Established Patient  Follow Up Time: 1 month

## 2024-05-14 NOTE — DISCHARGE NOTE PROVIDER - NSDCMRMEDTOKEN_GEN_ALL_CORE_FT
aspirin 81 mg oral tablet, chewable: 1 tab(s) orally once a day  atorvastatin 80 mg oral tablet: 1 tab(s) orally once a day (at bedtime)  donepezil 5 mg oral tablet: 1 tab(s) orally once a day (at bedtime)  mirtazapine 7.5 mg oral tablet: 1 tab(s) orally once a day (at bedtime)  senna leaf extract oral tablet: 2 tab(s) orally once a day (at bedtime)  sertraline 50 mg oral tablet: 1 tab(s) orally once a day   mirtazapine 7.5 mg oral tablet: 1 tab(s) orally once a day (at bedtime)  sertraline 50 mg oral tablet: 1 tab(s) orally once a day   aspirin 81 mg oral tablet, chewable: 1 tab(s) orally once a day  donepezil 5 mg oral tablet: 1 tab(s) orally once a day (at bedtime)  mirtazapine 7.5 mg oral tablet: 1 tab(s) orally once a day (at bedtime)  senna leaf extract oral tablet: 2 tab(s) orally once a day (at bedtime)  sertraline 50 mg oral tablet: 1 tab(s) orally once a day   aspirin 81 mg oral tablet, chewable: 1 tab(s) orally once a day  donepezil 5 mg oral tablet: 1 tab(s) orally once a day (at bedtime)  midodrine 5 mg oral tablet: 1 tab(s) orally 3 times a day as needed for low blood pressure and feeling dizzy  mirtazapine 7.5 mg oral tablet: 1 tab(s) orally once a day (at bedtime)  senna leaf extract oral tablet: 2 tab(s) orally once a day (at bedtime)  sertraline 50 mg oral tablet: 1 tab(s) orally once a day   aspirin 81 mg oral tablet, chewable: 1 tab(s) orally once a day  donepezil 5 mg oral tablet: 1 tab(s) orally once a day (at bedtime)  midodrine 5 mg oral tablet: 1 tab(s) orally 3 times a day as needed for low blood pressure and feeling dizzy  mirtazapine 7.5 mg oral tablet: 1 tab(s) orally once a day (at bedtime)  senna leaf extract oral tablet: 2 tab(s) orally once a day (at bedtime)  sertraline 50 mg oral tablet: 1 tab(s) orally once a day  triamcinolone 0.025% topical cream: Apply topically to affected area 2 times a day to affected areas

## 2024-05-14 NOTE — DISCHARGE NOTE PROVIDER - PROVIDER TOKENS
PROVIDER:[TOKEN:[[id number]],FOLLOWUP:[2 weeks],ESTABLISHEDPATIENT:[T]],PROVIDER:[TOKEN:[[id number]],FOLLOWUP:[1 month],ESTABLISHEDPATIENT:[T]] PROVIDER:[TOKEN:[[id number]],FOLLOWUP:[1-3 days],ESTABLISHEDPATIENT:[T]],PROVIDER:[TOKEN:[[id number]],FOLLOWUP:[1 month],ESTABLISHEDPATIENT:[T]]

## 2024-05-14 NOTE — DISCHARGE NOTE PROVIDER - CARE PROVIDERS DIRECT ADDRESSES
,DirectAddress_Unknown,[email or web address]

## 2024-05-15 PROCEDURE — 99232 SBSQ HOSP IP/OBS MODERATE 35: CPT

## 2024-05-15 PROCEDURE — 93971 EXTREMITY STUDY: CPT | Mod: 26,RT

## 2024-05-15 RX ORDER — MIDODRINE HYDROCHLORIDE 2.5 MG/1
5 TABLET ORAL THREE TIMES A DAY
Refills: 0 | Status: DISCONTINUED | OUTPATIENT
Start: 2024-05-15 | End: 2024-05-16

## 2024-05-15 RX ADMIN — SERTRALINE 50 MILLIGRAM(S): 25 TABLET, FILM COATED ORAL at 12:35

## 2024-05-15 RX ADMIN — SENNA PLUS 2 TABLET(S): 8.6 TABLET ORAL at 21:51

## 2024-05-15 RX ADMIN — ENOXAPARIN SODIUM 40 MILLIGRAM(S): 100 INJECTION SUBCUTANEOUS at 21:51

## 2024-05-15 RX ADMIN — MIDODRINE HYDROCHLORIDE 5 MILLIGRAM(S): 2.5 TABLET ORAL at 17:49

## 2024-05-15 RX ADMIN — MIRTAZAPINE 7.5 MILLIGRAM(S): 45 TABLET, ORALLY DISINTEGRATING ORAL at 21:51

## 2024-05-15 RX ADMIN — DONEPEZIL HYDROCHLORIDE 5 MILLIGRAM(S): 10 TABLET, FILM COATED ORAL at 21:51

## 2024-05-15 RX ADMIN — Medication 81 MILLIGRAM(S): at 12:35

## 2024-05-15 RX ADMIN — POLYETHYLENE GLYCOL 3350 17 GRAM(S): 17 POWDER, FOR SOLUTION ORAL at 17:49

## 2024-05-15 RX ADMIN — MIDODRINE HYDROCHLORIDE 5 MILLIGRAM(S): 2.5 TABLET ORAL at 12:37

## 2024-05-15 RX ADMIN — PANTOPRAZOLE SODIUM 40 MILLIGRAM(S): 20 TABLET, DELAYED RELEASE ORAL at 05:02

## 2024-05-15 NOTE — CONSULT NOTE ADULT - ASSESSMENT
#Irritant contact dermatitis     Plan  - Triamcinolone cream BID to affected areas  #Irritant contact dermatitis: moderate     Plan  - counseling for sensitive skin care done, to use a soap for people with sensitive skin like Dove or Aveeno  - can use Triamcinolone cream BID to affected areas

## 2024-05-15 NOTE — PROGRESS NOTE ADULT - ASSESSMENT
75 yo F with hx of Anxiety and Mild dementia presents to ED for AMS and numbness.     #Transient confusion - resolved  # H/o Anxiety/Panic disorder / Mild dementia  #Suspected Global transient amnesia   - s/p stroke code and neuro eval in ED   - CTH: neg for acute intracranial pathology.   - CTP: No evidence of perfusion abnormality.  - CTA H & N: No evidence of intracranial vessel occlusion or aneurysm. No evidence of greater than 50% carotid or vertebral artery stenosis.  - UA positive>>will monitor off Abx as no urinary symptoms  - MRI brain negative  -rEEG negative  -Aspirin and Atorvastatin started before MRI. WIll d/c as MRI negative    -    #Orthostatic Hypotension, persistent   pt/daughter report chronic symptoms when OOB Ax w/ low BP in pt/daughter   s/p IVfs   thigh high TEDs  pt/family counselled about preventive measures  started on midodrine     #RUErash  ? due to Rocephin  Benadryl  ue duplex engative for DVT  now rash on thighs  derm consulted         #MISC:  -DVT ppx: Lovenox SC  -GI ppx: None    -Diet: Regular  -Activity: as tolerated      follow up: follow derm. rash on thighs now. positive orthostatics. midodrine added. monitor BP. need to check orthostatics before discharge.   discussed at length with family on phone

## 2024-05-15 NOTE — PROGRESS NOTE ADULT - ASSESSMENT
73 yo F with hx of Anxiety and Mild dementia presents to ED for AMS and numbness.     #AMS, transient- resolved   #Orthostatic Hypotension  - Ddx: Anxiety/Panic disorder   - s/p stroke code and neuro eval in ED   - CTH: neg for acute intracranial pathology.   - CTP: No evidence of perfusion abnormality.  - CTA H & N: No evidence of intracranial vessel occlusion or aneurysm. No evidence of greater than 50% carotid or vertebral artery stenosis.  - UA positive>>will monitor off Abx as no urinary symptoms   -UCx: Negative  -TSH, B12, Folate noted  -Neuro checks per routine  -MRI brain: No acute pathology  -rEEG: generalized slowing  -continue Aspirin, Atorvastatin discontinued  -s/p IVF for Orthostatic hypotension, Thigh Stocking advised  -Neuro Follow up OP    #Right UE redness  -no exposure of IV contrast or IV Abx  -Possibly positional  -IV line exchanged  -Conservative management    #Anxiety  #Dementia   -neuro Follow up outpatient.     #MISC:  -DVT ppx: Lovenox SC  -GI ppx: None    -Diet: Regular  -Activity: as tolerated  -Dispo: D/C planning   73 yo F with hx of Anxiety and Mild dementia presents to ED for AMS and numbness.     #AMS, transient- resolved   #Orthostatic Hypotension  - Ddx: Anxiety/Panic disorder   - s/p stroke code and neuro eval in ED   - CTH: neg for acute intracranial pathology.   - CTP: No evidence of perfusion abnormality.  - CTA H & N: No evidence of intracranial vessel occlusion or aneurysm. No evidence of greater than 50% carotid or vertebral artery stenosis.  - UA positive>>will monitor off Abx as no urinary symptoms   -UCx: Negative  -TSH, B12, Folate noted  -Neuro checks per routine  -MRI brain: No acute pathology  -rEEG: generalized slowing  -continue Aspirin, Atorvastatin discontinued  -s/p IVF for Orthostatic hypotension, Thigh Stocking advised  -Neuro Follow up OP    #Right UE redness  -no exposure of IV contrast or IV Abx  -Possibly positional  -IV line exchanged  -RUE venous duplex: ?Chronic thrombus in right cephalic vein  -Conservative management    #Anxiety  #Dementia   -neuro Follow up outpatient.     #MISC:  -DVT ppx: Lovenox SC  -GI ppx: None    -Diet: Regular  -Activity: as tolerated  -Dispo: D/C planning

## 2024-05-15 NOTE — CONSULT NOTE ADULT - SUBJECTIVE AND OBJECTIVE BOX
HPI:  75yo female PMHx anxiety and mild dementia presenting to the ED complaining of AMS and numbness. Family at bedside. State pt started to become confused around 8pm yesterday. Pt was not making sense and asking about family members who have passed. Pt was also confused about family being in her home where they live. Pt went to bed and this morning when the patient woke up she started to complain about left sided facial numbness and chest tightness. Pt currently asking daughter what happened to her yesterday as she has no recollection. Currently patient denies all neurologic complaints. Of significance, pt has had similar episode in the past 15 yrs ago when her son got into legal trouble and she felt extreme stress. Upon arrival to the ED, stroke code was activated. All findings were negative. Pt denies chest pain, SOB, abd pain, urinary symptoms.     In ED T(C): 36.6 (05-12-24 @ 13:53), Max: 36.6 (05-12-24 @ 13:53)  T(F): 97.8 (05-12-24 @ 13:53), Max: 97.8 (05-12-24 @ 13:53)  HR: 84 (05-12-24 @ 16:30) (84 - 87)  BP: 164/73 (05-12-24 @ 16:30) (164/73 - 196/82)  RR: 18 (05-12-24 @ 16:30) (18 - 18)  SpO2: 99% (05-12-24 @ 16:30) (99% - 99%)  Wt(kg): --  Labs UA- LE and bacteria, WBC- 10   Imaging all stroke protocol CT findings of head negative  NIH score 1 so no tpa indicated  received ceftriaxone in the ED   (12 May 2024 17:04)      PAST MEDICAL & SURGICAL HISTORY:  No pertinent past medical history      History of appendectomy            MEDICATIONS  (STANDING):  aspirin  chewable 81 milliGRAM(s) Oral daily  donepezil 5 milliGRAM(s) Oral at bedtime  enoxaparin Injectable 40 milliGRAM(s) SubCutaneous every 24 hours  midodrine. 5 milliGRAM(s) Oral three times a day  mirtazapine 7.5 milliGRAM(s) Oral at bedtime  pantoprazole    Tablet 40 milliGRAM(s) Oral before breakfast  polyethylene glycol 3350 17 Gram(s) Oral two times a day  senna 2 Tablet(s) Oral at bedtime  sertraline 50 milliGRAM(s) Oral daily  sodium chloride 0.9% Bolus 500 milliLiter(s) IV Bolus once  sodium chloride 0.9%. 1000 milliLiter(s) (100 mL/Hr) IV Continuous <Continuous>    MEDICATIONS  (PRN):  aluminum hydroxide/magnesium hydroxide/simethicone Suspension 30 milliLiter(s) Oral every 6 hours PRN Dyspepsia  diphenhydrAMINE 25 milliGRAM(s) Oral every 6 hours PRN Rash and/or Itching      Allergies    No Known Allergies    Intolerances        SOCIAL HISTORY:    FAMILY HISTORY:  No pertinent family history in first degree relatives        Vital Signs Last 24 Hrs  T(C): 36.6 (15 May 2024 14:00), Max: 36.9 (14 May 2024 20:08)  T(F): 97.9 (15 May 2024 14:00), Max: 98.4 (14 May 2024 20:08)  HR: 75 (15 May 2024 14:00) (70 - 78)  BP: 146/68 (15 May 2024 14:00) (109/65 - 146/68)  BP(mean): --  RR: 18 (15 May 2024 14:00) (18 - 18)  SpO2: 99% (15 May 2024 14:00) (96% - 99%)    Parameters below as of 15 May 2024 14:00  Patient On (Oxygen Delivery Method): room air          LABS:                RADIOLOGY & ADDITIONAL STUDIES: 73 yo F c/o red patches on the arms and legs for one day, getting better as per patient and family on 5/16 ~ 8 pm, when the patient only had a rash on the blt medial thighs and R UE. As per patient the rashes were never itchy, painful or tender. She never had a similar rash before. No chills, no fever.   HPI/ PMHx: anxiety and mild dementia presented to the ED complaining of AMS and numbness. Family at bedside. State pt started to become confused , was not making sense and asking about family members who have passed the day before admission. Pt was also confused about family being in her home where they live. Pt went to bed and the morning of admission when the patient woke up she started to complain about left sided facial numbness and chest tightness. Pt had no recollection about the day before. Currently patient denies  neurologic complaints. Of significance, pt has had similar episode in the past 15 yrs ago when her son got into legal trouble and she felt extreme stress. Upon arrival to the ED, stroke code was activated. All findings were negative. Pt denies chest pain, SOB, abd pain, urinary symptoms.     In ED T(C): 36.6 (05-12-24 @ 13:53), Max: 36.6 (05-12-24 @ 13:53)  T(F): 97.8 (05-12-24 @ 13:53), Max: 97.8 (05-12-24 @ 13:53)  HR: 84 (05-12-24 @ 16:30) (84 - 87)  BP: 164/73 (05-12-24 @ 16:30) (164/73 - 196/82)  RR: 18 (05-12-24 @ 16:30) (18 - 18)  SpO2: 99% (05-12-24 @ 16:30) (99% - 99%)  Wt(kg): --  Labs UA- LE and bacteria, WBC- 10   Imaging all stroke protocol CT findings of head negative  NIH score 1 so no tpa indicated  received ceftriaxone in the ED   (12 May 2024 17:04)      PAST MEDICAL & SURGICAL HISTORY:  No pertinent past medical history      History of appendectomy      MEDICATIONS  (STANDING):  aspirin  chewable 81 milliGRAM(s) Oral daily  donepezil 5 milliGRAM(s) Oral at bedtime  enoxaparin Injectable 40 milliGRAM(s) SubCutaneous every 24 hours  midodrine. 5 milliGRAM(s) Oral three times a day  mirtazapine 7.5 milliGRAM(s) Oral at bedtime  pantoprazole    Tablet 40 milliGRAM(s) Oral before breakfast  polyethylene glycol 3350 17 Gram(s) Oral two times a day  senna 2 Tablet(s) Oral at bedtime  sertraline 50 milliGRAM(s) Oral daily  sodium chloride 0.9% Bolus 500 milliLiter(s) IV Bolus once  sodium chloride 0.9%. 1000 milliLiter(s) (100 mL/Hr) IV Continuous <Continuous>    MEDICATIONS  (PRN):  aluminum hydroxide/magnesium hydroxide/simethicone Suspension 30 milliLiter(s) Oral every 6 hours PRN Dyspepsia  diphenhydrAMINE 25 milliGRAM(s) Oral every 6 hours PRN Rash and/or Itching      Allergies    No Known Allergies    Intolerances        SOCIAL HISTORY:    FAMILY HISTORY:  No pertinent family history in first degree relatives        Vital Signs Last 24 Hrs  T(C): 36.6 (15 May 2024 14:00), Max: 36.9 (14 May 2024 20:08)  T(F): 97.9 (15 May 2024 14:00), Max: 98.4 (14 May 2024 20:08)  HR: 75 (15 May 2024 14:00) (70 - 78)  BP: 146/68 (15 May 2024 14:00) (109/65 - 146/68)  BP(mean): --  RR: 18 (15 May 2024 14:00) (18 - 18)  SpO2: 99% (15 May 2024 14:00) (96% - 99%)    Parameters below as of 15 May 2024 14:00  Patient On (Oxygen Delivery Method): room air      PE: Alert/ NAD/ with patient resting comfortably in bed on her back/ 2 daughters by her side    - eryth patches blanching blt thighs and less in the lower triceps area  - no mucosal lesion involvement  - no dermatographism on the L arm    LABS: NC

## 2024-05-15 NOTE — PHYSICAL THERAPY INITIAL EVALUATION ADULT - ADDITIONAL COMMENTS
Pt reports lives alone in pvt home has 4-5 steps to enter. Pt was independent with a cane prior to admission, daughter assists with IADLs.

## 2024-05-15 NOTE — CONSULT NOTE ADULT - ATTENDING COMMENTS
#Irritant contact dermatitis: moderate     Plan  - counseling for sensitive skin care done, to use a soap for people with sensitive skin like Dove or Aveeno  - can use Triamcinolone cream BID to affected areas    Please don't hesitate to call me on Teams if the rash gets worse or with any questions.

## 2024-05-15 NOTE — PHYSICAL THERAPY INITIAL EVALUATION ADULT - GAIT DISTANCE, PT EVAL
1 loss of balance with independent recovery. Pt reports sleepy from meds . No c/o of dizziness . + TEDS/100 feet

## 2024-05-15 NOTE — PHYSICAL THERAPY INITIAL EVALUATION ADULT - SITTING BALANCE: STATIC
Pt appears to slept throughout the night  No distress noted  Safety precaution maintained  good balance

## 2024-05-15 NOTE — PHYSICAL THERAPY INITIAL EVALUATION ADULT - GENERAL OBSERVATIONS, REHAB EVAL
8:30-9:11 Pt encountered semifowler in bed in NAD. Agreeable for PT. + IV locked for ambulation by Eirn TABARES. + TEDS in place. Pt with errythema noted to Erin ARMSTRONG RN is aware. BP supine: 173/88 97 bpm, seated 168/85 97 bpm, standin/82 92 bpm Post tx 144/81 HR 78 bpm. Pt c/o of sleepiness from pt report of being given meds to help sleep.

## 2024-05-15 NOTE — PHYSICAL THERAPY INITIAL EVALUATION ADULT - PERTINENT HX OF CURRENT PROBLEM, REHAB EVAL
Pt is a 73yo female PMHx anxiety and mild dementia presenting to the ED complaining of AMS and numbness. Family at bedside. State pt started to become confused around 8pm yesterday. Pt was not making sense and asking about family members who have passed. Pt was also confused about family being in her home where they live. Pt went to bed and this morning when the patient woke up she started to complain about left sided facial numbness and chest tightness. Pt currently asking daughter what happened to her yesterday as she has no recollection. Currently patient denies all neurologic complaints. Of significance, pt has had similar episode in the past 15 yrs ago when her son got into legal trouble and she felt extreme stress. Upon arrival to the ED, stroke code was activated. All findings were negative. Pt denies chest pain, SOB, abd pain, urinary symptoms.

## 2024-05-16 VITALS — HEIGHT: 64 IN | WEIGHT: 174.17 LBS

## 2024-05-16 LAB
ANION GAP SERPL CALC-SCNC: 11 MMOL/L — SIGNIFICANT CHANGE UP (ref 7–14)
BUN SERPL-MCNC: 12 MG/DL — SIGNIFICANT CHANGE UP (ref 10–20)
CALCIUM SERPL-MCNC: 8.7 MG/DL — SIGNIFICANT CHANGE UP (ref 8.4–10.5)
CHLORIDE SERPL-SCNC: 104 MMOL/L — SIGNIFICANT CHANGE UP (ref 98–110)
CO2 SERPL-SCNC: 24 MMOL/L — SIGNIFICANT CHANGE UP (ref 17–32)
CREAT SERPL-MCNC: 0.8 MG/DL — SIGNIFICANT CHANGE UP (ref 0.7–1.5)
EGFR: 78 ML/MIN/1.73M2 — SIGNIFICANT CHANGE UP
GLUCOSE SERPL-MCNC: 84 MG/DL — SIGNIFICANT CHANGE UP (ref 70–99)
HCT VFR BLD CALC: 41.3 % — SIGNIFICANT CHANGE UP (ref 37–47)
HGB BLD-MCNC: 13.7 G/DL — SIGNIFICANT CHANGE UP (ref 12–16)
MCHC RBC-ENTMCNC: 28.7 PG — SIGNIFICANT CHANGE UP (ref 27–31)
MCHC RBC-ENTMCNC: 33.2 G/DL — SIGNIFICANT CHANGE UP (ref 32–37)
MCV RBC AUTO: 86.6 FL — SIGNIFICANT CHANGE UP (ref 81–99)
NRBC # BLD: 0 /100 WBCS — SIGNIFICANT CHANGE UP (ref 0–0)
PLATELET # BLD AUTO: 188 K/UL — SIGNIFICANT CHANGE UP (ref 130–400)
PMV BLD: 10.4 FL — SIGNIFICANT CHANGE UP (ref 7.4–10.4)
POTASSIUM SERPL-MCNC: 3.9 MMOL/L — SIGNIFICANT CHANGE UP (ref 3.5–5)
POTASSIUM SERPL-SCNC: 3.9 MMOL/L — SIGNIFICANT CHANGE UP (ref 3.5–5)
RBC # BLD: 4.77 M/UL — SIGNIFICANT CHANGE UP (ref 4.2–5.4)
RBC # FLD: 12.5 % — SIGNIFICANT CHANGE UP (ref 11.5–14.5)
SODIUM SERPL-SCNC: 139 MMOL/L — SIGNIFICANT CHANGE UP (ref 135–146)
WBC # BLD: 8.49 K/UL — SIGNIFICANT CHANGE UP (ref 4.8–10.8)
WBC # FLD AUTO: 8.49 K/UL — SIGNIFICANT CHANGE UP (ref 4.8–10.8)

## 2024-05-16 PROCEDURE — 99239 HOSP IP/OBS DSCHRG MGMT >30: CPT

## 2024-05-16 RX ORDER — MIDODRINE HYDROCHLORIDE 2.5 MG/1
5 TABLET ORAL
Refills: 0 | Status: DISCONTINUED | OUTPATIENT
Start: 2024-05-16 | End: 2024-05-16

## 2024-05-16 RX ORDER — MIDODRINE HYDROCHLORIDE 2.5 MG/1
1 TABLET ORAL
Qty: 30 | Refills: 0
Start: 2024-05-16

## 2024-05-16 RX ORDER — MIDODRINE HYDROCHLORIDE 2.5 MG/1
1 TABLET ORAL
Qty: 30 | Refills: 0
Start: 2024-05-16 | End: 2024-05-25

## 2024-05-16 RX ADMIN — MIDODRINE HYDROCHLORIDE 5 MILLIGRAM(S): 2.5 TABLET ORAL at 11:27

## 2024-05-16 RX ADMIN — POLYETHYLENE GLYCOL 3350 17 GRAM(S): 17 POWDER, FOR SOLUTION ORAL at 05:21

## 2024-05-16 RX ADMIN — PANTOPRAZOLE SODIUM 40 MILLIGRAM(S): 20 TABLET, DELAYED RELEASE ORAL at 05:21

## 2024-05-16 RX ADMIN — Medication 81 MILLIGRAM(S): at 11:27

## 2024-05-16 RX ADMIN — MIDODRINE HYDROCHLORIDE 5 MILLIGRAM(S): 2.5 TABLET ORAL at 05:21

## 2024-05-16 RX ADMIN — SERTRALINE 50 MILLIGRAM(S): 25 TABLET, FILM COATED ORAL at 11:27

## 2024-05-16 NOTE — PROGRESS NOTE ADULT - ASSESSMENT
73 yo F with hx of Anxiety and Mild dementia presents to ED for AMS and numbness.     #Transient confusion - resolved  # H/o Anxiety/Panic disorder / Mild dementia  #Suspected Global transient amnesia   - s/p stroke code and neuro eval in ED   - CTH: neg for acute intracranial pathology.   - CTP: No evidence of perfusion abnormality.  - CTA H & N: No evidence of intracranial vessel occlusion or aneurysm. No evidence of greater than 50% carotid or vertebral artery stenosis.  - UA positive>>will monitor off Abx as no urinary symptoms  - MRI brain negative  -rEEG negative  -Aspirin and Atorvastatin started before MRI. WIll d/c as MRI negative  -    #Orthostatic Hypotension  pt/daughter report chronic symptoms when OOB Ax w/ low BP in pt/daughter and son  s/p IVFs  thigh high TEDs, abd binder, Midodrine PRN  pt/family counselled about preventive measures  will start Midodrine if still orthostatic    #RUE rash  ? due to Rocephin vs contact dematitis  Benadryl  Triamcinolone as per derm    Discharge instructions discussed and patient/daughter know when to seek immediate medical attention.  Patient has proper follow up.  All results discussed and patient/daughter aware they require further follow up/work up.  Stressed importance of proper follow up.  Medications prescribed and changes discussed.  All questions and concerns from patient and family addressed. Understanding of instructions verbalized.    My note supersedes the residents note should a discrepancy arise.    Chart and notes personally reviewed.  Care Discussed with Consultants/Other Providers/ Housestaff [ x] YES [ ] NO   Radiology, labs, old records personally reviewed.    discussed w/ housestaff, nursing, case management, neuro team,daughter    Time-based billing (NON-critical care).     35 minutes spent on total encounter. The necessity of the time spent during the encounter on this date of service was due to:     time spent on review of labs, imaging studies, old records, obtaining history, personally examining patient, counselling and communicating with patient/ family, entering orders for medications/tests/etc, discussions with other health care providers, documentation in electronic health records, independent interpretation of labs, imaging/procedure results and care coordination.

## 2024-05-16 NOTE — PROGRESS NOTE ADULT - ASSESSMENT
75 yo F with hx of Anxiety and Mild dementia presents to ED for AMS and numbness.     #AMS, transient- resolved   #Orthostatic Hypotension  - Ddx: Anxiety/Panic disorder   - s/p stroke code and neuro eval in ED   - CTH: neg for acute intracranial pathology.   - CTP: No evidence of perfusion abnormality.  - CTA H & N: No evidence of intracranial vessel occlusion or aneurysm. No evidence of greater than 50% carotid or vertebral artery stenosis.  - UA positive>>will monitor off Abx as no urinary symptoms   -UCx: Negative  -TSH, B12, Folate noted  -Neuro checks per routine  -MRI brain: No acute pathology  -rEEG: generalized slowing  -continue Aspirin, Atorvastatin discontinued  -s/p IVF for Orthostatic hypotension, Thigh Stocking advised  -Neuro Follow up OP    #Irritant contact dermatitis  -no exposure of IV contrast or IV Abx  -IV line exchanged  -RUE venous duplex: ?Chronic thrombus in right cephalic vein  -derma c/s appreciated: rec pt to use soaps such as tristan and aveeno. can also  use trimanicolone bid    #Anxiety  #Dementia   -neuro Follow up outpatient.     #MISC:  -DVT ppx: Lovenox SC  -GI ppx: None    -Diet: Regular  -Activity: as tolerated  -Dispo: D/C planning

## 2024-05-16 NOTE — PROGRESS NOTE ADULT - SUBJECTIVE AND OBJECTIVE BOX
24H events:    Patient is a 74y old Female who presents with a chief complaint of AMS (15 May 2024 16:32)    Primary diagnosis of AMS (altered mental status)    Today is hospital day 4d. This morning patient was seen and examined at bedside, resting comfortably in bed.    No acute or major events overnight.    Code Status:    PAST MEDICAL & SURGICAL HISTORY  No pertinent past medical history    History of appendectomy      SOCIAL HISTORY:  Social History:      ALLERGIES:  No Known Allergies    MEDICATIONS:  STANDING MEDICATIONS  aspirin  chewable 81 milliGRAM(s) Oral daily  donepezil 5 milliGRAM(s) Oral at bedtime  enoxaparin Injectable 40 milliGRAM(s) SubCutaneous every 24 hours  midodrine. 5 milliGRAM(s) Oral <User Schedule>  mirtazapine 7.5 milliGRAM(s) Oral at bedtime  pantoprazole    Tablet 40 milliGRAM(s) Oral before breakfast  polyethylene glycol 3350 17 Gram(s) Oral two times a day  senna 2 Tablet(s) Oral at bedtime  sertraline 50 milliGRAM(s) Oral daily  sodium chloride 0.9% Bolus 500 milliLiter(s) IV Bolus once  sodium chloride 0.9%. 1000 milliLiter(s) IV Continuous <Continuous>    PRN MEDICATIONS  aluminum hydroxide/magnesium hydroxide/simethicone Suspension 30 milliLiter(s) Oral every 6 hours PRN  diphenhydrAMINE 25 milliGRAM(s) Oral every 6 hours PRN    VITALS:   T(F): 98  HR: 76  BP: 120/61  RR: 18  SpO2: 98%    PHYSICAL EXAM:  GENERAL: NAD, lying in bed comfortably  HEAD:  Atraumatic, Normocephalic  EYES: EOMI, conjunctiva and sclera clear  ENT: Moist mucous membranes  NECK: Supple, No JVD  CHEST/LUNG: Clear to auscultation bilaterally; No rales, rhonchi, wheezing, or rubs. Unlabored respirations  HEART: regular rate and rhythm; No murmurs, rubs, or gallops  ABDOMEN: Bowel sounds present; Soft, Nontender, Nondistended.    EXTREMITIES: Warm. No clubbing, cyanosis, or edema  NERVOUS SYSTEM:  Alert & Oriented X3. No focal deficits   SKIN: No rashes or lesions    LABS:                        13.7   8.49  )-----------( 188      ( 16 May 2024 05:56 )             41.3     05-16    139  |  104  |  12  ----------------------------<  84  3.9   |  24  |  0.8    Ca    8.7      16 May 2024 05:56        Urinalysis Basic - ( 16 May 2024 05:56 )    Color: x / Appearance: x / SG: x / pH: x  Gluc: 84 mg/dL / Ketone: x  / Bili: x / Urobili: x   Blood: x / Protein: x / Nitrite: x   Leuk Esterase: x / RBC: x / WBC x   Sq Epi: x / Non Sq Epi: x / Bacteria: x                RADIOLOGY:          
translated by daughter at pt's request    Patient is a 74y old  Female who presents with a chief complaint of AMS (13 May 2024 10:02)    INTERVAL HPI/OVERNIGHT EVENTS: Patient was examined and seen at bedside. This morning pt is resting comfortably in bed and reports no new issues or overnight events. No significant complaints, feels better. Still dizzy when getting OOB. +Orthostatics.   ROS: Denies CP, SOB, AP, new weakness  All other systems reviewed and are within normal limits.  InitialHPI:  73yo female PMHx anxiety and mild dementia presenting to the ED complaining of AMS and numbness. Family at bedside. State pt started to become confused around 8pm yesterday while paying respect to the family who recently had a loved one passed. Pt was not making sense and asking about family members who have passed. Pt was also confused about family being in her home where they live. Pt went to bed and this morning when the patient woke up she started to complain about left sided facial numbness and tightness over Lt breast. Pt was asking daughter what happened to her yesterday as she has no recollection. Currently patient denies all neurologic complaints. Of significance, pt has had similar episode in the past 15 yrs ago when her son got into legal trouble and she felt extreme stress. Upon arrival to the ED, stroke code was activated. All findings were negative. Pt denied chest pain, SOB, abd pain, urinary symptoms, nausea, diaphoresis.     In ED T(C): 36.6 (05-12-24 @ 13:53), Max: 36.6 (05-12-24 @ 13:53)  T(F): 97.8 (05-12-24 @ 13:53), Max: 97.8 (05-12-24 @ 13:53)  HR: 84 (05-12-24 @ 16:30) (84 - 87)  BP: 164/73 (05-12-24 @ 16:30) (164/73 - 196/82)  RR: 18 (05-12-24 @ 16:30) (18 - 18)  SpO2: 99% (05-12-24 @ 16:30) (99% - 99%)  Wt(kg): --  Labs UA- LE and bacteria, WBC- 10   Imaging all stroke protocol CT findings of head negative  NIH score 1 so no tpa indicated  received ceftriaxone in the ED   (12 May 2024 17:04)    PAST MEDICAL & SURGICAL HISTORY:  No pertinent past medical history      History of appendectomy          General: NAD, AAO3, mild cognitive deficits  HEENT:  EOMI, no LAD  CV: S1 S2  Resp: decreased breath sounds at bases  GI: NT/ND/S +BS  MS: no clubbing/cyanosis/edema, + pulses b/l. Proximal RUE areas of maculopapular erythema. No swelling  Neuro: nonfocal, +reflexes thruout          Home Medications:  mirtazapine 7.5 mg oral tablet: 1 tab(s) orally once a day (at bedtime) (14 May 2024 10:15)  sertraline 50 mg oral tablet: 1 tab(s) orally once a day (14 May 2024 10:15)    MEDICATIONS  (STANDING):  aspirin  chewable 81 milliGRAM(s) Oral daily  donepezil 5 milliGRAM(s) Oral at bedtime  enoxaparin Injectable 40 milliGRAM(s) SubCutaneous every 24 hours  mirtazapine 7.5 milliGRAM(s) Oral at bedtime  pantoprazole    Tablet 40 milliGRAM(s) Oral before breakfast  polyethylene glycol 3350 17 Gram(s) Oral two times a day  senna 2 Tablet(s) Oral at bedtime  sertraline 50 milliGRAM(s) Oral daily  sodium chloride 0.9% Bolus 500 milliLiter(s) IV Bolus once  sodium chloride 0.9%. 1000 milliLiter(s) (100 mL/Hr) IV Continuous <Continuous>    MEDICATIONS  (PRN):  aluminum hydroxide/magnesium hydroxide/simethicone Suspension 30 milliLiter(s) Oral every 6 hours PRN Dyspepsia  diphenhydrAMINE 25 milliGRAM(s) Oral every 6 hours PRN Rash and/or Itching    Vital Signs Last 24 Hrs  T(C): 36.9 (14 May 2024 20:08), Max: 37 (13 May 2024 22:03)  T(F): 98.4 (14 May 2024 20:08), Max: 98.6 (13 May 2024 22:03)  HR: 78 (14 May 2024 20:08) (60 - 86)  BP: 109/65 (14 May 2024 20:08) (109/65 - 130/65)  BP(mean): 77 (14 May 2024 04:36) (77 - 77)  RR: 18 (14 May 2024 20:08) (17 - 18)  SpO2: 96% (14 May 2024 20:08) (96% - 100%)    Parameters below as of 14 May 2024 20:08  Patient On (Oxygen Delivery Method): room air      CAPILLARY BLOOD GLUCOSE        LABS:                        14.6   6.55  )-----------( 203      ( 13 May 2024 06:51 )             43.4       Mg     2.0     05-13                        Urinalysis with Rflx Culture (collected 12 May 2024 15:13)    Culture - Urine (collected 12 May 2024 15:13)  Source: Clean Catch None  Final Report (14 May 2024 15:49):    <10,000 CFU/mL Normal Urogenital Camelia      Consultant Notes Reviewed:  [x ] YES  [ ] NO  Care Discussed with Consultants/Other Providers/ Housestaff [ x] YES  [ ] NO  Radiology, labs, EKGs, new studies personally reviewed.                                                  
NINFA OLIVAS 74y Female  MRN#: [id number]     Hospital Day: 3d    Pt is currently admitted with the primary diagnosis of  Altered mental status        SUBJECTIVE     Overnight events  None    Subjective complaints  Pt was evaluated this am. Patient denied any active complaints.                                           ----------------------------------------------------------  OBJECTIVE  PAST MEDICAL & SURGICAL HISTORY  No pertinent past medical history    History of appendectomy                                              -----------------------------------------------------------  ALLERGIES:  No Known Allergies                                            ------------------------------------------------------------    HOME MEDICATIONS  Home Medications:  mirtazapine 7.5 mg oral tablet: 1 tab(s) orally once a day (at bedtime) (14 May 2024 10:15)  sertraline 50 mg oral tablet: 1 tab(s) orally once a day (14 May 2024 10:15)                           MEDICATIONS:  STANDING MEDICATIONS  aspirin  chewable 81 milliGRAM(s) Oral daily  donepezil 5 milliGRAM(s) Oral at bedtime  enoxaparin Injectable 40 milliGRAM(s) SubCutaneous every 24 hours  mirtazapine 7.5 milliGRAM(s) Oral at bedtime  pantoprazole    Tablet 40 milliGRAM(s) Oral before breakfast  polyethylene glycol 3350 17 Gram(s) Oral two times a day  senna 2 Tablet(s) Oral at bedtime  sertraline 50 milliGRAM(s) Oral daily  sodium chloride 0.9% Bolus 500 milliLiter(s) IV Bolus once  sodium chloride 0.9%. 1000 milliLiter(s) IV Continuous <Continuous>    PRN MEDICATIONS  aluminum hydroxide/magnesium hydroxide/simethicone Suspension 30 milliLiter(s) Oral every 6 hours PRN  diphenhydrAMINE 25 milliGRAM(s) Oral every 6 hours PRN                                            ------------------------------------------------------------  VITAL SIGNS: Last 24 Hours  T(C): 36.6 (15 May 2024 05:06), Max: 36.9 (14 May 2024 20:08)  T(F): 97.8 (15 May 2024 05:06), Max: 98.4 (14 May 2024 20:08)  HR: 70 (15 May 2024 05:06) (70 - 80)  BP: 127/73 (15 May 2024 05:06) (109/65 - 130/65)  BP(mean): --  RR: 18 (15 May 2024 05:06) (18 - 18)  SpO2: 98% (15 May 2024 05:06) (96% - 99%)                                             --------------------------------------------------------------  LABS:                      Urinalysis with Rflx Culture (collected 12 May 2024 15:13)    Culture - Urine (collected 12 May 2024 15:13)  Source: Clean Catch None  Final Report (14 May 2024 15:49):    <10,000 CFU/mL Normal Urogenital Camelia                                                    -------------------------------------------------------------  RADIOLOGY:  MR Head No Cont (05.13.24 @ 22:04):   No MRI evidence to suggest acute ischemic change.                                            --------------------------------------------------------------    PHYSICAL EXAM:  GENERAL: NAD, lying in bed comfortably  HEAD:  Atraumatic, Normocephalic  EYES: EOMI, conjunctiva and sclera clear  ENT: Moist mucous membranes  NECK: Supple, No JVD  CHEST/LUNG: Clear to auscultation bilaterally; No rales, rhonchi, wheezing, or rubs. Unlabored respirations  HEART: regular rate and rhythm; No murmurs, rubs, or gallops  ABDOMEN: Bowel sounds present; Soft, Nontender, Nondistended.    EXTREMITIES: Warm. No clubbing, cyanosis, or edema  NERVOUS SYSTEM:  Alert & Oriented X2. No focal deficits   SKIN: No rashes or lesions                                           --------------------------------------------------------------                
translated by daughter at pt's request    Patient is a 74y old  Female who presents with a chief complaint of AMS (13 May 2024 10:02)    INTERVAL HPI/OVERNIGHT EVENTS: Patient was examined and seen at bedside. This morning pt is resting comfortably in bed and reports no new issues or overnight events. No new complaints, dizziness and rash better. ROS: Denies CP, SOB, AP, new weakness  All other systems reviewed and are within normal limits.  InitialHPI:  75yo female PMHx anxiety and mild dementia presenting to the ED complaining of AMS and numbness. Family at bedside. State pt started to become confused around 8pm yesterday while paying respect to the family who recently had a loved one passed. Pt was not making sense and asking about family members who have passed. Pt was also confused about family being in her home where they live. Pt went to bed and this morning when the patient woke up she started to complain about left sided facial numbness and tightness over Lt breast. Pt was asking daughter what happened to her yesterday as she has no recollection. Currently patient denies all neurologic complaints. Of significance, pt has had similar episode in the past 15 yrs ago when her son got into legal trouble and she felt extreme stress. Upon arrival to the ED, stroke code was activated. All findings were negative. Pt denied chest pain, SOB, abd pain, urinary symptoms, nausea, diaphoresis.     In ED T(C): 36.6 (05-12-24 @ 13:53), Max: 36.6 (05-12-24 @ 13:53)  T(F): 97.8 (05-12-24 @ 13:53), Max: 97.8 (05-12-24 @ 13:53)  HR: 84 (05-12-24 @ 16:30) (84 - 87)  BP: 164/73 (05-12-24 @ 16:30) (164/73 - 196/82)  RR: 18 (05-12-24 @ 16:30) (18 - 18)  SpO2: 99% (05-12-24 @ 16:30) (99% - 99%)  Wt(kg): --  Labs UA- LE and bacteria, WBC- 10   Imaging all stroke protocol CT findings of head negative  NIH score 1 so no tpa indicated  received ceftriaxone in the ED   (12 May 2024 17:04)    PAST MEDICAL & SURGICAL HISTORY:  No pertinent past medical history      History of appendectomy          General: NAD, AAO3, mild cognitive deficits  HEENT:  EOMI, no LAD  CV: S1 S2  Resp: decreased breath sounds at bases  GI: NT/ND/S +BS  MS: no clubbing/cyanosis/edema, + pulses b/l. Proximal RUE areas of maculopapular erythema. No swelling  Neuro: nonfocal, +reflexes thruout            Home Medications:  mirtazapine 7.5 mg oral tablet: 1 tab(s) orally once a day (at bedtime) (14 May 2024 10:15)  sertraline 50 mg oral tablet: 1 tab(s) orally once a day (14 May 2024 10:15)    MEDICATIONS  (STANDING):  aspirin  chewable 81 milliGRAM(s) Oral daily  donepezil 5 milliGRAM(s) Oral at bedtime  enoxaparin Injectable 40 milliGRAM(s) SubCutaneous every 24 hours  midodrine. 5 milliGRAM(s) Oral <User Schedule>  mirtazapine 7.5 milliGRAM(s) Oral at bedtime  pantoprazole    Tablet 40 milliGRAM(s) Oral before breakfast  polyethylene glycol 3350 17 Gram(s) Oral two times a day  senna 2 Tablet(s) Oral at bedtime  sertraline 50 milliGRAM(s) Oral daily  sodium chloride 0.9% Bolus 500 milliLiter(s) IV Bolus once  sodium chloride 0.9%. 1000 milliLiter(s) (100 mL/Hr) IV Continuous <Continuous>    MEDICATIONS  (PRN):  aluminum hydroxide/magnesium hydroxide/simethicone Suspension 30 milliLiter(s) Oral every 6 hours PRN Dyspepsia  diphenhydrAMINE 25 milliGRAM(s) Oral every 6 hours PRN Rash and/or Itching    Vital Signs Last 24 Hrs  T(C): 36.7 (16 May 2024 13:29), Max: 36.7 (16 May 2024 04:37)  T(F): 98 (16 May 2024 13:29), Max: 98.1 (16 May 2024 04:37)  HR: 76 (16 May 2024 13:29) (70 - 76)  BP: 120/61 (16 May 2024 13:29) (115/50 - 120/61)  BP(mean): 71 (16 May 2024 04:37) (71 - 71)  RR: 18 (16 May 2024 13:29) (18 - 18)  SpO2: 98% (16 May 2024 04:37) (98% - 98%)    Parameters below as of 16 May 2024 04:37  Patient On (Oxygen Delivery Method): room air      CAPILLARY BLOOD GLUCOSE        LABS:                        13.7   8.49  )-----------( 188      ( 16 May 2024 05:56 )             41.3     05-16    139  |  104  |  12  ----------------------------<  84  3.9   |  24  |  0.8    Ca    8.7      16 May 2024 05:56              Urinalysis Basic - ( 16 May 2024 05:56 )    Color: x / Appearance: x / SG: x / pH: x  Gluc: 84 mg/dL / Ketone: x  / Bili: x / Urobili: x   Blood: x / Protein: x / Nitrite: x   Leuk Esterase: x / RBC: x / WBC x   Sq Epi: x / Non Sq Epi: x / Bacteria: x              Consultant Notes Reviewed:  [x ] YES  [ ] NO  Care Discussed with Consultants/Other Providers/ Housestaff [ x] YES  [ ] NO  Radiology, labs, EKGs, new studies personally reviewed.                                                    
NINFA OLIVAS 74y Female  MRN#: [id number]     Hospital Day: 1d    Pt is currently admitted with the primary diagnosis of  Altered mental status        SUBJECTIVE     Overnight events  None    Subjective complaints  Pt was evaluated this am. Patient denied any active complaints and per patient her symptoms are improving.                                            ----------------------------------------------------------  OBJECTIVE  PAST MEDICAL & SURGICAL HISTORY  No pertinent past medical history    History of appendectomy                                              -----------------------------------------------------------  ALLERGIES:  No Known Allergies                                            ------------------------------------------------------------    HOME MEDICATIONS  Home Medications:                           MEDICATIONS:  STANDING MEDICATIONS  cefTRIAXone   IVPB 1000 milliGRAM(s) IV Intermittent every 24 hours  enoxaparin Injectable 40 milliGRAM(s) SubCutaneous every 24 hours    PRN MEDICATIONS                                            ------------------------------------------------------------  VITAL SIGNS: Last 24 Hours  T(C): 36.7 (13 May 2024 04:30), Max: 36.7 (13 May 2024 04:30)  T(F): 98.1 (13 May 2024 04:30), Max: 98.1 (13 May 2024 04:30)  HR: 62 (13 May 2024 04:30) (62 - 87)  BP: 130/70 (13 May 2024 04:30) (130/70 - 196/82)  BP(mean): 90 (13 May 2024 04:30) (90 - 96)  RR: 18 (13 May 2024 04:30) (18 - 18)  SpO2: 98% (13 May 2024 04:30) (95% - 99%)                                             --------------------------------------------------------------  LABS:                        14.6   6.55  )-----------( 203      ( 13 May 2024 06:51 )             43.4         138  |  101  |  14  ----------------------------<  114<H>  4.4   |  25  |  0.8    Ca    10.3      12 May 2024 14:15  Mg     2.0         TPro  7.3  /  Alb  4.7  /  TBili  0.3  /  DBili  x   /  AST  21  /  ALT  13  /  AlkPhos  86      PT/INR - ( 12 May 2024 14:15 )   PT: 11.60 sec;   INR: 1.02 ratio         PTT - ( 12 May 2024 14:15 )  PTT:27.2 sec  Urinalysis Basic - ( 12 May 2024 15:13 )    Color: Yellow / Appearance: Turbid / S.020 / pH: x  Gluc: x / Ketone: Negative mg/dL  / Bili: Negative / Urobili: 0.2 mg/dL   Blood: x / Protein: Negative mg/dL / Nitrite: Negative   Leuk Esterase: Moderate / RBC: 3 /HPF / WBC 10 /HPF   Sq Epi: x / Non Sq Epi: >36 /HPF / Bacteria: Moderate /HPF              Urinalysis with Rflx Culture (collected 12 May 2024 15:13)                                                    -------------------------------------------------------------  RADIOLOGY:  CT Angio Neck Stroke Protocol w/ IV Cont (24 @ 14:33):   PERFUSION:  No evidence of perfusion abnormality.    CTA HEAD:  No evidence of intracranial vessel occlusion or aneurysm.    CTA NECK:  No evidence of greater than 50% carotid or vertebral artery stenosis.                                              --------------------------------------------------------------    PHYSICAL EXAM:  GENERAL: NAD, lying in bed comfortably  HEAD:  Atraumatic, Normocephalic  EYES: EOMI, conjunctiva and sclera clear  ENT: Moist mucous membranes  NECK: Supple, No JVD  CHEST/LUNG: Clear to auscultation bilaterally; No rales, rhonchi, wheezing, or rubs. Unlabored respirations  HEART: regular rate and rhythm; No murmurs, rubs, or gallops  ABDOMEN: Bowel sounds present; Soft, Nontender, Nondistended.    EXTREMITIES: Warm. No clubbing, cyanosis, or edema  NERVOUS SYSTEM:  Alert & Oriented X2. No focal deficits   SKIN: No rashes or lesions                                           --------------------------------------------------------------                
translated by daughter at pt's request    Patient is a 74y old  Female who presents with a chief complaint of AMS (13 May 2024 10:02)    INTERVAL HPI/OVERNIGHT EVENTS: Patient was examined and seen at bedside.   patient reported intermittent dizziness. during standing . discussed aty length with daughter on phone  new rash on thigh also .       PAST MEDICAL & SURGICAL HISTORY:  No pertinent past medical history      History of appendectomy          General: NAD, AAO3, mild cognitive deficits  HEENT:  EOMI, no LAD  CV: S1 S2  Resp: decreased breath sounds at bases  GI: NT/ND/S +BS  MS: no clubbing/cyanosis/edema, + pulses b/l. Proximal RUE areas of  erythema. also on thighs as well. No swelling. no duscharge or warmth  Neuro: nonfocal,          Home Medications:  mirtazapine 7.5 mg oral tablet: 1 tab(s) orally once a day (at bedtime) (14 May 2024 10:15)  sertraline 50 mg oral tablet: 1 tab(s) orally once a day (14 May 2024 10:15)    MEDICATIONS  (STANDING):  aspirin  chewable 81 milliGRAM(s) Oral daily  donepezil 5 milliGRAM(s) Oral at bedtime  enoxaparin Injectable 40 milliGRAM(s) SubCutaneous every 24 hours  mirtazapine 7.5 milliGRAM(s) Oral at bedtime  pantoprazole    Tablet 40 milliGRAM(s) Oral before breakfast  polyethylene glycol 3350 17 Gram(s) Oral two times a day  senna 2 Tablet(s) Oral at bedtime  sertraline 50 milliGRAM(s) Oral daily  sodium chloride 0.9% Bolus 500 milliLiter(s) IV Bolus once  sodium chloride 0.9%. 1000 milliLiter(s) (100 mL/Hr) IV Continuous <Continuous>    MEDICATIONS  (PRN):  aluminum hydroxide/magnesium hydroxide/simethicone Suspension 30 milliLiter(s) Oral every 6 hours PRN Dyspepsia  diphenhydrAMINE 25 milliGRAM(s) Oral every 6 hours PRN Rash and/or Itching    Vital Signs Last 24 Hrs  Vital Signs Last 24 Hrs  T(C): 36.6 (15 May 2024 14:00), Max: 36.9 (14 May 2024 20:08)  T(F): 97.9 (15 May 2024 14:00), Max: 98.4 (14 May 2024 20:08)  HR: 75 (15 May 2024 14:00) (70 - 78)  BP: 146/68 (15 May 2024 14:00) (109/65 - 146/68)  BP(mean): --  RR: 18 (15 May 2024 14:00) (18 - 18)  SpO2: 99% (15 May 2024 14:00) (96% - 99%)    Parameters below as of 15 May 2024 14:00  Patient On (Oxygen Delivery Method): room air        Parameters below as of 14 May 2024 20:08  Patient On (Oxygen Delivery Method): room air      CAPILLARY BLOOD GLUCOSE        LABS:               LABS:                                      Urinalysis with Rflx Culture (collected 12 May 2024 15:13)    Culture - Urine (collected 12 May 2024 15:13)  Source: Clean Catch None  Final Report (14 May 2024 15:49):    <10,000 CFU/mL Normal Urogenital Camelia      Consultant Notes Reviewed:  [x ] YES  [ ] NO  Care Discussed with Consultants/Other Providers/ Housestaff [ x] YES  [ ] NO  Radiology, labs, EKGs, new studies personally reviewed.                                                  
translated by daughter at pt's request    Patient is a 74y old  Female who presents with a chief complaint of AMS (13 May 2024 10:02)    INTERVAL HPI/OVERNIGHT EVENTS: Patient was examined and seen at bedside. This morning pt is resting comfortably in bed and reports no new issues or overnight events. No significant complaints, feels much better. Just slightly dizzy when getting OOB. Daughter is at bedside and reports that pt is back to baseline.  ROS: Denies CP, SOB, AP, new weakness  All other systems reviewed and are within normal limits.  InitialHPI:  75yo female PMHx anxiety and mild dementia presenting to the ED complaining of AMS and numbness. Family at bedside. State pt started to become confused around 8pm yesterday while paying respect to the family who recently had a loved one passed. Pt was not making sense and asking about family members who have passed. Pt was also confused about family being in her home where they live. Pt went to bed and this morning when the patient woke up she started to complain about left sided facial numbness and tightness over Lt breast. Pt was asking daughter what happened to her yesterday as she has no recollection. Currently patient denies all neurologic complaints. Of significance, pt has had similar episode in the past 15 yrs ago when her son got into legal trouble and she felt extreme stress. Upon arrival to the ED, stroke code was activated. All findings were negative. Pt denied chest pain, SOB, abd pain, urinary symptoms, nausea, diaphoresis.     In ED T(C): 36.6 (24 @ 13:53), Max: 36.6 (24 @ 13:53)  T(F): 97.8 (24 @ 13:53), Max: 97.8 (24 @ 13:53)  HR: 84 (24 @ 16:30) (84 - 87)  BP: 164/73 (24 @ 16:30) (164/73 - 196/82)  RR: 18 (24 @ 16:30) (18 - 18)  SpO2: 99% (24 @ 16:30) (99% - 99%)  Wt(kg): --  Labs UA- LE and bacteria, WBC- 10   Imaging all stroke protocol CT findings of head negative  NIH score 1 so no tpa indicated  received ceftriaxone in the ED   (12 May 2024 17:04)    PAST MEDICAL & SURGICAL HISTORY:  No pertinent past medical history      History of appendectomy          General: NAD, AAO3, mild cognitive deficits  HEENT:  EOMI, no LAD  CV: S1 S2  Resp: decreased breath sounds at bases  GI: NT/ND/S +BS  MS: no clubbing/cyanosis/edema, + pulses b/l  Neuro: nonfocal, +reflexes thruout    MEDICATIONS  (STANDING):  aspirin  chewable 81 milliGRAM(s) Oral daily  atorvastatin 80 milliGRAM(s) Oral at bedtime  donepezil 5 milliGRAM(s) Oral at bedtime  enoxaparin Injectable 40 milliGRAM(s) SubCutaneous every 24 hours  mirtazapine 7.5 milliGRAM(s) Oral at bedtime  pantoprazole    Tablet 40 milliGRAM(s) Oral before breakfast  polyethylene glycol 3350 17 Gram(s) Oral two times a day  senna 2 Tablet(s) Oral at bedtime  sertraline 50 milliGRAM(s) Oral daily    MEDICATIONS  (PRN):  aluminum hydroxide/magnesium hydroxide/simethicone Suspension 30 milliLiter(s) Oral every 6 hours PRN Dyspepsia    Vital Signs Last 24 Hrs  T(C): 36.7 (13 May 2024 04:30), Max: 36.7 (13 May 2024 04:30)  T(F): 98.1 (13 May 2024 04:30), Max: 98.1 (13 May 2024 04:30)  HR: 62 (13 May 2024 04:30) (62 - 62)  BP: 130/70 (13 May 2024 04:30) (130/70 - 130/70)  BP(mean): 90 (13 May 2024 04:30) (90 - 90)  RR: 18 (13 May 2024 04:30) (18 - 18)  SpO2: 98% (13 May 2024 04:30) (98% - 98%)    Parameters below as of 13 May 2024 14:03  Patient On (Oxygen Delivery Method): room air      CAPILLARY BLOOD GLUCOSE                              14.6   6.55  )-----------( 203      ( 13 May 2024 06:51 )             43.4     05-12    138  |  101  |  14  ----------------------------<  114<H>  4.4   |  25  |  0.8    Ca    10.3      12 May 2024 14:15  Mg     2.0     05-13    TPro  7.3  /  Alb  4.7  /  TBili  0.3  /  DBili  x   /  AST  21  /  ALT  13  /  AlkPhos  86  05-12    LIVER FUNCTIONS - ( 12 May 2024 14:15 )  Alb: 4.7 g/dL / Pro: 7.3 g/dL / ALK PHOS: 86 U/L / ALT: 13 U/L / AST: 21 U/L / GGT: x               PT/INR - ( 12 May 2024 14:15 )   PT: 11.60 sec;   INR: 1.02 ratio         PTT - ( 12 May 2024 14:15 )  PTT:27.2 sec  Urinalysis Basic - ( 12 May 2024 15:13 )    Color: Yellow / Appearance: Turbid / S.020 / pH: x  Gluc: x / Ketone: Negative mg/dL  / Bili: Negative / Urobili: 0.2 mg/dL   Blood: x / Protein: Negative mg/dL / Nitrite: Negative   Leuk Esterase: Moderate / RBC: 3 /HPF / WBC 10 /HPF   Sq Epi: x / Non Sq Epi: >36 /HPF / Bacteria: Moderate /HPF              Urinalysis with Rflx Culture (collected 12 May 2024 15:13)      Chart, Consultant(s) Notes Reviewed:  [x ] YES  [ ] NO  Care Discussed with Consultants/Other Providers/ Housestaff [ x] YES  [ ] NO  Radiology, labs, old available records personally reviewed.

## 2024-05-16 NOTE — CHART NOTE - NSCHARTNOTEFT_GEN_A_CORE
Patient is a 74 year old with diagnosis of orthostatic hypotension/dementia/debility. Patient has decreased kvng, increased time in double stance, decreased step length, impaired balance; decreased strength and endurance. Patient needs RW for safe ambulation, significantly improve the patient's ability to participate in MRADL's and will be used on regular basis in the home Patient is a 74 year old with diagnosis of orthostatic hypotension/dementia/debility. Patient has decreased kvng, increased time in double stance, decreased step length, impaired balance; decreased strength and endurance. Patient needs RW for safe ambulation, significantly improve the patient's ability to participate in MRADL's and will be used on regular basis in the home.

## 2024-05-23 DIAGNOSIS — G45.4 TRANSIENT GLOBAL AMNESIA: ICD-10-CM

## 2024-05-23 DIAGNOSIS — N39.0 URINARY TRACT INFECTION, SITE NOT SPECIFIED: ICD-10-CM

## 2024-05-23 DIAGNOSIS — F03.A0 UNSPECIFIED DEMENTIA, MILD, WITHOUT BEHAVIORAL DISTURBANCE, PSYCHOTIC DISTURBANCE, MOOD DISTURBANCE, AND ANXIETY: ICD-10-CM

## 2024-05-23 DIAGNOSIS — I95.1 ORTHOSTATIC HYPOTENSION: ICD-10-CM

## 2024-05-23 DIAGNOSIS — R41.82 ALTERED MENTAL STATUS, UNSPECIFIED: ICD-10-CM

## 2024-05-23 DIAGNOSIS — F41.9 ANXIETY DISORDER, UNSPECIFIED: ICD-10-CM

## 2024-05-23 DIAGNOSIS — Z90.49 ACQUIRED ABSENCE OF OTHER SPECIFIED PARTS OF DIGESTIVE TRACT: ICD-10-CM

## 2024-05-23 DIAGNOSIS — R41.0 DISORIENTATION, UNSPECIFIED: ICD-10-CM

## 2024-05-23 DIAGNOSIS — L24.9 IRRITANT CONTACT DERMATITIS, UNSPECIFIED CAUSE: ICD-10-CM

## 2025-02-19 NOTE — ASU PREOP CHECKLIST - NS PREOP CHK HIBICLENS NA
Diet, Consistent Carbohydrate/No Snacks:   DASH/TLC {Sodium & Cholesterol Restricted} (DASH)  1500mL Fluid Restriction ([id number]) (02-12-25 @ 07:58) [Active]       N/A